# Patient Record
Sex: FEMALE | Race: WHITE | HISPANIC OR LATINO | URBAN - METROPOLITAN AREA
[De-identification: names, ages, dates, MRNs, and addresses within clinical notes are randomized per-mention and may not be internally consistent; named-entity substitution may affect disease eponyms.]

---

## 2021-04-21 ENCOUNTER — EMERGENCY (EMERGENCY)
Facility: HOSPITAL | Age: 76
LOS: 1 days | Discharge: HOME | End: 2021-04-21
Attending: EMERGENCY MEDICINE
Payer: MEDICARE

## 2021-04-21 VITALS
OXYGEN SATURATION: 98 % | HEART RATE: 70 BPM | DIASTOLIC BLOOD PRESSURE: 88 MMHG | SYSTOLIC BLOOD PRESSURE: 157 MMHG | TEMPERATURE: 98 F | RESPIRATION RATE: 18 BRPM

## 2021-04-21 VITALS
RESPIRATION RATE: 17 BRPM | TEMPERATURE: 99 F | DIASTOLIC BLOOD PRESSURE: 83 MMHG | OXYGEN SATURATION: 98 % | HEART RATE: 86 BPM | SYSTOLIC BLOOD PRESSURE: 146 MMHG

## 2021-04-21 LAB
ALBUMIN SERPL ELPH-MCNC: 3.4 G/DL — LOW (ref 3.5–5)
ALP SERPL-CCNC: 77 U/L — SIGNIFICANT CHANGE UP (ref 40–120)
ALT FLD-CCNC: 28 U/L DA — SIGNIFICANT CHANGE UP (ref 10–60)
ANION GAP SERPL CALC-SCNC: 4 MMOL/L — LOW (ref 5–17)
APTT BLD: 33.6 SEC — SIGNIFICANT CHANGE UP (ref 27.5–35.5)
AST SERPL-CCNC: 24 U/L — SIGNIFICANT CHANGE UP (ref 10–40)
BASOPHILS # BLD AUTO: 0.04 K/UL — SIGNIFICANT CHANGE UP (ref 0–0.2)
BASOPHILS NFR BLD AUTO: 0.6 % — SIGNIFICANT CHANGE UP (ref 0–2)
BILIRUB SERPL-MCNC: 0.4 MG/DL — SIGNIFICANT CHANGE UP (ref 0.2–1.2)
BUN SERPL-MCNC: 17 MG/DL — SIGNIFICANT CHANGE UP (ref 7–18)
CALCIUM SERPL-MCNC: 8.9 MG/DL — SIGNIFICANT CHANGE UP (ref 8.4–10.5)
CHLORIDE SERPL-SCNC: 107 MMOL/L — SIGNIFICANT CHANGE UP (ref 96–108)
CO2 SERPL-SCNC: 26 MMOL/L — SIGNIFICANT CHANGE UP (ref 22–31)
CREAT SERPL-MCNC: 0.7 MG/DL — SIGNIFICANT CHANGE UP (ref 0.5–1.3)
EOSINOPHIL # BLD AUTO: 0.03 K/UL — SIGNIFICANT CHANGE UP (ref 0–0.5)
EOSINOPHIL NFR BLD AUTO: 0.4 % — SIGNIFICANT CHANGE UP (ref 0–6)
GLUCOSE SERPL-MCNC: 89 MG/DL — SIGNIFICANT CHANGE UP (ref 70–99)
HCT VFR BLD CALC: 40.7 % — SIGNIFICANT CHANGE UP (ref 34.5–45)
HGB BLD-MCNC: 13.5 G/DL — SIGNIFICANT CHANGE UP (ref 11.5–15.5)
IMM GRANULOCYTES NFR BLD AUTO: 0.4 % — SIGNIFICANT CHANGE UP (ref 0–1.5)
INR BLD: 0.98 RATIO — SIGNIFICANT CHANGE UP (ref 0.88–1.16)
LYMPHOCYTES # BLD AUTO: 1.59 K/UL — SIGNIFICANT CHANGE UP (ref 1–3.3)
LYMPHOCYTES # BLD AUTO: 21.9 % — SIGNIFICANT CHANGE UP (ref 13–44)
MCHC RBC-ENTMCNC: 29.9 PG — SIGNIFICANT CHANGE UP (ref 27–34)
MCHC RBC-ENTMCNC: 33.2 GM/DL — SIGNIFICANT CHANGE UP (ref 32–36)
MCV RBC AUTO: 90.2 FL — SIGNIFICANT CHANGE UP (ref 80–100)
MONOCYTES # BLD AUTO: 0.58 K/UL — SIGNIFICANT CHANGE UP (ref 0–0.9)
MONOCYTES NFR BLD AUTO: 8 % — SIGNIFICANT CHANGE UP (ref 2–14)
NEUTROPHILS # BLD AUTO: 4.98 K/UL — SIGNIFICANT CHANGE UP (ref 1.8–7.4)
NEUTROPHILS NFR BLD AUTO: 68.7 % — SIGNIFICANT CHANGE UP (ref 43–77)
NRBC # BLD: 0 /100 WBCS — SIGNIFICANT CHANGE UP (ref 0–0)
PLATELET # BLD AUTO: 197 K/UL — SIGNIFICANT CHANGE UP (ref 150–400)
POTASSIUM SERPL-MCNC: 4.2 MMOL/L — SIGNIFICANT CHANGE UP (ref 3.5–5.3)
POTASSIUM SERPL-SCNC: 4.2 MMOL/L — SIGNIFICANT CHANGE UP (ref 3.5–5.3)
PROT SERPL-MCNC: 7 G/DL — SIGNIFICANT CHANGE UP (ref 6–8.3)
PROTHROM AB SERPL-ACNC: 11.7 SEC — SIGNIFICANT CHANGE UP (ref 10.6–13.6)
RBC # BLD: 4.51 M/UL — SIGNIFICANT CHANGE UP (ref 3.8–5.2)
RBC # FLD: 12.3 % — SIGNIFICANT CHANGE UP (ref 10.3–14.5)
SODIUM SERPL-SCNC: 137 MMOL/L — SIGNIFICANT CHANGE UP (ref 135–145)
WBC # BLD: 7.25 K/UL — SIGNIFICANT CHANGE UP (ref 3.8–10.5)
WBC # FLD AUTO: 7.25 K/UL — SIGNIFICANT CHANGE UP (ref 3.8–10.5)

## 2021-04-21 PROCEDURE — 85025 COMPLETE CBC W/AUTO DIFF WBC: CPT

## 2021-04-21 PROCEDURE — 85730 THROMBOPLASTIN TIME PARTIAL: CPT

## 2021-04-21 PROCEDURE — 93971 EXTREMITY STUDY: CPT | Mod: 26,RT

## 2021-04-21 PROCEDURE — 36415 COLL VENOUS BLD VENIPUNCTURE: CPT

## 2021-04-21 PROCEDURE — 85610 PROTHROMBIN TIME: CPT

## 2021-04-21 PROCEDURE — 93971 EXTREMITY STUDY: CPT

## 2021-04-21 PROCEDURE — 80053 COMPREHEN METABOLIC PANEL: CPT

## 2021-04-21 PROCEDURE — 99284 EMERGENCY DEPT VISIT MOD MDM: CPT | Mod: 25

## 2021-04-21 PROCEDURE — 99285 EMERGENCY DEPT VISIT HI MDM: CPT

## 2021-04-21 NOTE — ED PROVIDER NOTE - PROGRESS NOTE DETAILS
Pt informed of popliteal cyst, advised supportive care, PMD f/u, and consideration for outpatient MRI.

## 2021-04-21 NOTE — ED PROVIDER NOTE - NSFOLLOWUPINSTRUCTIONS_ED_ALL_ED_FT
Quiste divina Elder    LO QUE NECESITA SABER:    Un quiste de Jael es brando protuberancia abultada de líquido detrás de la rodilla. Un quiste de Jael puede desarrollarse si tiene brando lesión en la rodilla, o brando afección darline la osteoartritis o un trastorno del tejido conectivo. Un quiste de Jael también puede ser llamado quiste poplíteo.    INSTRUCCIONES SOBRE EL [facility]ITAL[facility]:    Regrese a la lai de emergencias si:  •Usted tiene dolor intenso.      •Usted tiene moretones en el tobillo debajo del quiste.      •Higginbotham pantorrilla se ve azulada debajo del quiste.      •Higginbotham pantorrilla o rodilla está inflamada o sangrando.      Llame a higginbotham médico si:  •Tiene fiebre.      •El dolor no mejora con medicamentos.      •Usted tiene preguntas o inquietudes acerca de higginbotham condición o cuidado.      Medicamentos:  •Los PRERNA,darline el ibuprofeno, ayudan a disminuir la inflamación, el dolor y la fiebre. Los PRERNA pueden causar sangrado estomacal o problemas renales en ciertas personas. Si usted rabia un medicamento anticoagulante, siempre pregúntele a higginbotham médico si los PRERNA son seguros para usted. Siempre selena la etiqueta de alina medicamento y siga las instrucciones.      •[place] cristian medicamentos darline se le haya indicado.Consulte con higginbotham médico si usted hill que higginbotham medicamento no le está ayudando o si presenta efectos secundarios. Infórmele si es alérgico a algún medicamento. Mantenga brando lista actualizada de los medicamentos, las vitaminas y los productos herbales que rabia. Incluya los siguientes datos de los medicamentos: cantidad, frecuencia y motivo de administración. Traiga con usted la lista o los envases de las píldoras a cristian citas de seguimiento. Lleve la lista de los medicamentos con usted en emily de brando emergencia.      Cuidado de higginbotham rodilla:  •Descanse tanto darline sea necesario.Limite movimiento mientras higginbotham rodilla se recupera. Rayne ayudará a disminuir el riesgo de hacer más daño a higginbotham rodilla. Puede que usted necesite utilizar muletas para quitar peso de la rodilla lesionada. Use las muletas darline se le indica.      •Póngale hielo a higginbotham rodilla.El hielo ayuda a disminuir la inflamación y el dolor. Use brando compresa de hielo o ponga hielo en brando bolsa plástica. Envuelva el hielo con brando toalla antes de colocarlo sobre higginbotham piel. Aplique el hielo sobre la rodilla de 15 a 20 minutos, de 3 a 4 veces al día. Charis esto por 2 a 3 días.      •Apoye higginbotham rodilla.Envuélvala con brando venda elástica para darle apoyo. Pregúntele a higginbotham médico si usted necesita brando abrazadera para más apoyo. Rayne ayudará a disminuir la hinchazón y el movimiento para que higginbotham rodilla se sane.      •Eleve higginbotham rodilla.Use almohadas para levantar higginbotham rodilla sobre el nivel de higginbotham corazón tan frecuentemente darline pueda. Rayne ayudará a disminuir la hinchazón. No coloque la almohada dagoberto debajo de higginbotham rodilla. Colóquela debajo de la pantorrilla en higginbotham lugar.  Elevar la pierna           •Vaya a terapia física según indicaciones.Un fisioterapeuta le puede enseñar ejercicios para ayudarle a mejorar el movimiento y la fuerza, y para disminuir el dolor.      Acuda a la consulta de control con higginbotham médico según las indicaciones:Anote cristian preguntas para que se acuerde de hacerlas leticia cristian visitas.       © Copyright [employer] 2021           back to top                          © Copyright [employer] 2021

## 2021-04-21 NOTE — ED PROVIDER NOTE - CLINICAL SUMMARY MEDICAL DECISION MAKING FREE TEXT BOX
77 y/o F patient with diffuse lower extremity swelling. Will do doppler of the right lower extremity, labs, and reassess. 77 y/o F patient with diffuse right lower leg swelling/pain. Will do doppler of the right lower extremity, labs, and reassess.

## 2021-04-21 NOTE — ED PROVIDER NOTE - OBJECTIVE STATEMENT
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 Andrei Contreras with c/o diffuse lower extremity swelling and pain. Patient denies any injury, chest pain, shortness of breath, history of DVT, PE or any other complaints. No smoking.

## 2021-04-21 NOTE — ED PROVIDER NOTE - PATIENT PORTAL LINK FT
You can access the FollowMyHealth Patient Portal offered by [facility] by registering at the following website: [email or web address]. By joining [employer]’s FollowMyHealth portal, you will also be able to view your health information using other applications (apps) compatible with our system.

## 2021-10-15 ENCOUNTER — EMERGENCY (EMERGENCY)
Facility: HOSPITAL | Age: 76
LOS: 1 days | Discharge: HOME | End: 2021-10-15
Attending: EMERGENCY MEDICINE
Payer: MEDICARE

## 2021-10-15 VITALS
SYSTOLIC BLOOD PRESSURE: 126 MMHG | DIASTOLIC BLOOD PRESSURE: 85 MMHG | HEART RATE: 71 BPM | TEMPERATURE: 98 F | OXYGEN SATURATION: 95 % | RESPIRATION RATE: 18 BRPM

## 2021-10-15 VITALS
SYSTOLIC BLOOD PRESSURE: 117 MMHG | OXYGEN SATURATION: 99 % | HEART RATE: 76 BPM | DIASTOLIC BLOOD PRESSURE: 69 MMHG | RESPIRATION RATE: 18 BRPM | TEMPERATURE: 98 F

## 2021-10-15 LAB
ALBUMIN SERPL ELPH-MCNC: 2.7 G/DL — LOW (ref 3.5–5)
ALP SERPL-CCNC: 69 U/L — SIGNIFICANT CHANGE UP (ref 40–120)
ALT FLD-CCNC: 17 U/L DA — SIGNIFICANT CHANGE UP (ref 10–60)
ANION GAP SERPL CALC-SCNC: 5 MMOL/L — SIGNIFICANT CHANGE UP (ref 5–17)
APPEARANCE UR: CLEAR — SIGNIFICANT CHANGE UP
AST SERPL-CCNC: 23 U/L — SIGNIFICANT CHANGE UP (ref 10–40)
BASOPHILS # BLD AUTO: 0.02 K/UL — SIGNIFICANT CHANGE UP (ref 0–0.2)
BASOPHILS NFR BLD AUTO: 0.2 % — SIGNIFICANT CHANGE UP (ref 0–2)
BILIRUB SERPL-MCNC: 0.6 MG/DL — SIGNIFICANT CHANGE UP (ref 0.2–1.2)
BILIRUB UR-MCNC: NEGATIVE — SIGNIFICANT CHANGE UP
BUN SERPL-MCNC: 18 MG/DL — SIGNIFICANT CHANGE UP (ref 7–18)
CALCIUM SERPL-MCNC: 9.3 MG/DL — SIGNIFICANT CHANGE UP (ref 8.4–10.5)
CHLORIDE SERPL-SCNC: 104 MMOL/L — SIGNIFICANT CHANGE UP (ref 96–108)
CO2 SERPL-SCNC: 28 MMOL/L — SIGNIFICANT CHANGE UP (ref 22–31)
COLOR SPEC: YELLOW — SIGNIFICANT CHANGE UP
CREAT SERPL-MCNC: 0.6 MG/DL — SIGNIFICANT CHANGE UP (ref 0.5–1.3)
DIFF PNL FLD: ABNORMAL
EOSINOPHIL # BLD AUTO: 0.06 K/UL — SIGNIFICANT CHANGE UP (ref 0–0.5)
EOSINOPHIL NFR BLD AUTO: 0.6 % — SIGNIFICANT CHANGE UP (ref 0–6)
GLUCOSE SERPL-MCNC: 127 MG/DL — HIGH (ref 70–99)
GLUCOSE UR QL: NEGATIVE — SIGNIFICANT CHANGE UP
HCT VFR BLD CALC: 46.5 % — HIGH (ref 34.5–45)
HGB BLD-MCNC: 14.6 G/DL — SIGNIFICANT CHANGE UP (ref 11.5–15.5)
IMM GRANULOCYTES NFR BLD AUTO: 0.4 % — SIGNIFICANT CHANGE UP (ref 0–1.5)
KETONES UR-MCNC: ABNORMAL
LEUKOCYTE ESTERASE UR-ACNC: NEGATIVE — SIGNIFICANT CHANGE UP
LIDOCAIN IGE QN: 62 U/L — LOW (ref 73–393)
LYMPHOCYTES # BLD AUTO: 0.83 K/UL — LOW (ref 1–3.3)
LYMPHOCYTES # BLD AUTO: 8.3 % — LOW (ref 13–44)
MCHC RBC-ENTMCNC: 29.6 PG — SIGNIFICANT CHANGE UP (ref 27–34)
MCHC RBC-ENTMCNC: 31.4 GM/DL — LOW (ref 32–36)
MCV RBC AUTO: 94.3 FL — SIGNIFICANT CHANGE UP (ref 80–100)
MONOCYTES # BLD AUTO: 0.88 K/UL — SIGNIFICANT CHANGE UP (ref 0–0.9)
MONOCYTES NFR BLD AUTO: 8.8 % — SIGNIFICANT CHANGE UP (ref 2–14)
NEUTROPHILS # BLD AUTO: 8.19 K/UL — HIGH (ref 1.8–7.4)
NEUTROPHILS NFR BLD AUTO: 81.7 % — HIGH (ref 43–77)
NITRITE UR-MCNC: NEGATIVE — SIGNIFICANT CHANGE UP
NRBC # BLD: 0 /100 WBCS — SIGNIFICANT CHANGE UP (ref 0–0)
PH UR: 6 — SIGNIFICANT CHANGE UP (ref 5–8)
PLATELET # BLD AUTO: 174 K/UL — SIGNIFICANT CHANGE UP (ref 150–400)
POTASSIUM SERPL-MCNC: 4.5 MMOL/L — SIGNIFICANT CHANGE UP (ref 3.5–5.3)
POTASSIUM SERPL-SCNC: 4.5 MMOL/L — SIGNIFICANT CHANGE UP (ref 3.5–5.3)
PROT SERPL-MCNC: 7.2 G/DL — SIGNIFICANT CHANGE UP (ref 6–8.3)
PROT UR-MCNC: 15
RBC # BLD: 4.93 M/UL — SIGNIFICANT CHANGE UP (ref 3.8–5.2)
RBC # FLD: 11.9 % — SIGNIFICANT CHANGE UP (ref 10.3–14.5)
SODIUM SERPL-SCNC: 137 MMOL/L — SIGNIFICANT CHANGE UP (ref 135–145)
SP GR SPEC: 1.02 — SIGNIFICANT CHANGE UP (ref 1.01–1.02)
UROBILINOGEN FLD QL: NEGATIVE — SIGNIFICANT CHANGE UP
WBC # BLD: 10.02 K/UL — SIGNIFICANT CHANGE UP (ref 3.8–10.5)
WBC # FLD AUTO: 10.02 K/UL — SIGNIFICANT CHANGE UP (ref 3.8–10.5)

## 2021-10-15 PROCEDURE — 99284 EMERGENCY DEPT VISIT MOD MDM: CPT

## 2021-10-15 PROCEDURE — 76830 TRANSVAGINAL US NON-OB: CPT | Mod: 26

## 2021-10-15 PROCEDURE — 76856 US EXAM PELVIC COMPLETE: CPT | Mod: 26

## 2021-10-15 PROCEDURE — 81001 URINALYSIS AUTO W/SCOPE: CPT

## 2021-10-15 PROCEDURE — 96374 THER/PROPH/DIAG INJ IV PUSH: CPT

## 2021-10-15 PROCEDURE — 93975 VASCULAR STUDY: CPT

## 2021-10-15 PROCEDURE — 99284 EMERGENCY DEPT VISIT MOD MDM: CPT | Mod: 25

## 2021-10-15 PROCEDURE — 96361 HYDRATE IV INFUSION ADD-ON: CPT

## 2021-10-15 PROCEDURE — 76857 US EXAM PELVIC LIMITED: CPT

## 2021-10-15 PROCEDURE — 85025 COMPLETE CBC W/AUTO DIFF WBC: CPT

## 2021-10-15 PROCEDURE — 36415 COLL VENOUS BLD VENIPUNCTURE: CPT

## 2021-10-15 PROCEDURE — 96375 TX/PRO/DX INJ NEW DRUG ADDON: CPT

## 2021-10-15 PROCEDURE — 83690 ASSAY OF LIPASE: CPT

## 2021-10-15 PROCEDURE — 76830 TRANSVAGINAL US NON-OB: CPT

## 2021-10-15 PROCEDURE — 93976 VASCULAR STUDY: CPT | Mod: 26,59

## 2021-10-15 PROCEDURE — 80053 COMPREHEN METABOLIC PANEL: CPT

## 2021-10-15 RX ORDER — KETOROLAC TROMETHAMINE 30 MG/ML
30 SYRINGE (ML) INJECTION ONCE
Refills: 0 | Status: DISCONTINUED | OUTPATIENT
Start: 2021-10-15 | End: 2021-10-15

## 2021-10-15 RX ORDER — SODIUM CHLORIDE 9 MG/ML
1000 INJECTION INTRAMUSCULAR; INTRAVENOUS; SUBCUTANEOUS ONCE
Refills: 0 | Status: COMPLETED | OUTPATIENT
Start: 2021-10-15 | End: 2021-10-15

## 2021-10-15 RX ORDER — IBUPROFEN 200 MG
1 TABLET ORAL
Qty: 20 | Refills: 0
Start: 2021-10-15

## 2021-10-15 RX ORDER — ONDANSETRON 8 MG/1
4 TABLET, FILM COATED ORAL ONCE
Refills: 0 | Status: COMPLETED | OUTPATIENT
Start: 2021-10-15 | End: 2021-10-15

## 2021-10-15 RX ADMIN — SODIUM CHLORIDE 1000 MILLILITER(S): 9 INJECTION INTRAMUSCULAR; INTRAVENOUS; SUBCUTANEOUS at 08:19

## 2021-10-15 RX ADMIN — SODIUM CHLORIDE 1000 MILLILITER(S): 9 INJECTION INTRAMUSCULAR; INTRAVENOUS; SUBCUTANEOUS at 10:06

## 2021-10-15 RX ADMIN — Medication 30 MILLIGRAM(S): at 09:05

## 2021-10-15 RX ADMIN — ONDANSETRON 4 MILLIGRAM(S): 8 TABLET, FILM COATED ORAL at 08:19

## 2021-10-15 RX ADMIN — Medication 30 MILLIGRAM(S): at 08:19

## 2021-10-15 NOTE — ED ADULT NURSE NOTE - OBJECTIVE STATEMENT
presents with c/o lower abdominal pain, nausea and vomiting onset last night denies dysuria fever/chills.

## 2021-10-15 NOTE — ED ADULT NURSE NOTE - NSIMPLEMENTINTERV_GEN_ALL_ED
Implemented All Fall Risk Interventions:  [place] to call system. Call bell, personal items and telephone within reach. Instruct patient to call for assistance. Room bathroom lighting operational. Non-slip footwear when patient is off stretcher. Physically safe environment: no spills, clutter or unnecessary equipment. Stretcher in lowest position, wheels locked, appropriate side rails in place. Provide visual cue, wrist band, yellow gown, etc. Monitor gait and stability. Monitor for mental status changes and reorient to person, place, and time. Review medications for side effects contributing to fall risk. Reinforce activity limits and safety measures with patient and family.

## 2021-10-15 NOTE — ED PROVIDER NOTE - OBJECTIVE STATEMENT
75 y/o female presents with lower abdominal pain.  Patient is [place] speaking.  Translation services offered however patient prefers to use  whom is at bedside.  PMHx of pseudogout.  Discomfort began hours prior to arrival and associated with nausea and vomiting.  No bloody and non-bilious.  pt denies constipation or diarrhea.  + urinary frequency.  Denies chest pain, fever, or vaginal discharge.

## 2021-10-15 NOTE — ED PROVIDER NOTE - PATIENT PORTAL LINK FT
You can access the FollowMyHealth Patient Portal offered by [facility] by registering at the following website: [email or web address]. By joining [employer]’s FollowMyHealth portal, you will also be able to view your health information using other applications (apps) compatible with our system.

## 2021-10-15 NOTE — ED PROVIDER NOTE - NSFOLLOWUPINSTRUCTIONS_ED_ALL_ED_FT
Ovarian Cyst       An ovarian cyst is a fluid-filled sac that forms on an ovary. The ovaries are small organs that produce eggs in women. Various types of cysts can form on the ovaries. Some may cause symptoms and require treatment. Most ovarian cysts go away on their own, are not cancerous (are benign), and do not cause problems.      What are the causes?  Ovarian cysts may be caused by:  •Ovarian hyperstimulation syndrome. This is a condition that can develop from taking fertility medicines. It causes multiple large ovarian cysts to form.      •Polycystic ovarian syndrome (PCOS). This is a common hormonal disorder that can cause ovarian cysts to form, and can cause problems with your period or fertility.      •The normal menstrual cycle.        What increases the risk?  The following factors may make you more likely to develop this condition:  •Being overweight or obese.      •Taking fertility medicines.      •Taking certain forms of hormonal birth control.      •Smoking.        What are the signs or symptoms?  Many ovarian cysts do not cause symptoms. If symptoms are present, they may include:  •Pelvic pain or pressure.      •Pain in the lower abdomen.      •Pain during sex.      •Abdominal swelling.      •Abnormal menstrual periods.      •Increasing pain with menstrual periods.        How is this diagnosed?  These cysts are commonly found during a routine pelvic exam. You may have tests to find out more about the cyst, such as:  •Ultrasound.      •CT scan.      •MRI.      •Blood tests.        How is this treated?    Many ovarian cysts go away on their own without treatment. Your health care provider may want to check your cyst regularly for 2–3 months to see if it changes. If you are in menopause, it is especially important to have your cyst monitored closely because menopausal women have a higher rate of ovarian cancer.  When treatment is needed, it may include:  •Medicines to help relieve pain.      •A procedure to drain the cyst (aspiration).      •Surgery to remove the whole cyst (cystectomy).      •Hormone treatment or birth control pills. These methods are sometimes used to help keep cysts from coming back.      •Surgery to remove the ovary (oophorectomy).        Follow these instructions at home:    •Take over-the-counter and prescription medicines only as told by your health care provider.      •Ask your health care provider if any medicine prescribed to you requires you to avoid driving or using machinery.      •Get regular pelvic exams and Pap tests as often as told by your health care provider.      •Return to your normal activities as told by your health care provider. Ask your health care provider what activities are safe for you.      • Do not use any products that contain nicotine or tobacco, such as cigarettes, e-cigarettes, and chewing tobacco. If you need help quitting, ask your health care provider.      •Keep all follow-up visits. This is important.        Contact a health care provider if:    •Your periods are late, irregular, painful, or they stop.      •You have pelvic pain that does not go away.      •You have pressure on your bladder or trouble emptying your bladder completely.    •You have any of the following:  •A feeling of fullness.      •You are gaining weight or losing weight without changing your exercise and eating habits.      •Pain, swelling, or bloating in the abdomen.      •Loss of appetite.      •Pain and pressure in your back and pelvis.        •You think you may be pregnant.        Get help right away if:    •You have abdominal or pelvic pain that is severe or gets worse.      •You cannot eat or drink without vomiting.      •You suddenly develop a fever or chills.      •Your menstrual period is much heavier than usual.        Summary    •An ovarian cyst is a fluid-filled sac that forms on an ovary.      •Some ovarian cysts may cause symptoms and require treatment.      •These cysts are commonly found during a routine pelvic exam.      •Many ovarian cysts go away on their own without treatment.      This information is not intended to replace advice given to you by your health care provider. Make sure you discuss any questions you have with your health care provider.      Document Revised: 05/27/2021 Document Reviewed: 05/27/2021    [employer] Patient Education © 2021 [employer]

## 2021-10-15 NOTE — ED PROVIDER NOTE - CLINICAL SUMMARY MEDICAL DECISION MAKING FREE TEXT BOX
pt presents with suprapubic pain, nausea and vomiting, + tender on exam.  UTI vs possible ovarian cyst vs less likely torsion. Will check labs, UA, sono, and reassess.

## 2022-03-21 ENCOUNTER — EMERGENCY (EMERGENCY)
Facility: HOSPITAL | Age: 77
LOS: 1 days | Discharge: HOME | End: 2022-03-21
Attending: STUDENT IN AN ORGANIZED HEALTH CARE EDUCATION/TRAINING PROGRAM
Payer: MEDICARE

## 2022-03-21 VITALS
TEMPERATURE: 100 F | SYSTOLIC BLOOD PRESSURE: 141 MMHG | HEIGHT: 66 IN | RESPIRATION RATE: 18 BRPM | DIASTOLIC BLOOD PRESSURE: 85 MMHG | HEART RATE: 77 BPM | WEIGHT: 169.98 LBS | OXYGEN SATURATION: 100 %

## 2022-03-21 LAB
ALBUMIN SERPL ELPH-MCNC: 2.8 G/DL — LOW (ref 3.5–5)
ALP SERPL-CCNC: 76 U/L — SIGNIFICANT CHANGE UP (ref 40–120)
ALT FLD-CCNC: 19 U/L DA — SIGNIFICANT CHANGE UP (ref 10–60)
ANION GAP SERPL CALC-SCNC: 6 MMOL/L — SIGNIFICANT CHANGE UP (ref 5–17)
AST SERPL-CCNC: 12 U/L — SIGNIFICANT CHANGE UP (ref 10–40)
BASOPHILS # BLD AUTO: 0.03 K/UL — SIGNIFICANT CHANGE UP (ref 0–0.2)
BASOPHILS NFR BLD AUTO: 0.3 % — SIGNIFICANT CHANGE UP (ref 0–2)
BILIRUB SERPL-MCNC: 0.4 MG/DL — SIGNIFICANT CHANGE UP (ref 0.2–1.2)
BUN SERPL-MCNC: 13 MG/DL — SIGNIFICANT CHANGE UP (ref 7–18)
CALCIUM SERPL-MCNC: 9.7 MG/DL — SIGNIFICANT CHANGE UP (ref 8.4–10.5)
CHLORIDE SERPL-SCNC: 103 MMOL/L — SIGNIFICANT CHANGE UP (ref 96–108)
CO2 SERPL-SCNC: 28 MMOL/L — SIGNIFICANT CHANGE UP (ref 22–31)
CREAT SERPL-MCNC: 0.74 MG/DL — SIGNIFICANT CHANGE UP (ref 0.5–1.3)
EGFR: 84 ML/MIN/1.73M2 — SIGNIFICANT CHANGE UP
EOSINOPHIL # BLD AUTO: 0.05 K/UL — SIGNIFICANT CHANGE UP (ref 0–0.5)
EOSINOPHIL NFR BLD AUTO: 0.6 % — SIGNIFICANT CHANGE UP (ref 0–6)
FLUAV AG NPH QL: SIGNIFICANT CHANGE UP
FLUBV AG NPH QL: SIGNIFICANT CHANGE UP
GLUCOSE SERPL-MCNC: 125 MG/DL — HIGH (ref 70–99)
HCT VFR BLD CALC: 40 % — SIGNIFICANT CHANGE UP (ref 34.5–45)
HGB BLD-MCNC: 13.5 G/DL — SIGNIFICANT CHANGE UP (ref 11.5–15.5)
IMM GRANULOCYTES NFR BLD AUTO: 0.3 % — SIGNIFICANT CHANGE UP (ref 0–1.5)
LYMPHOCYTES # BLD AUTO: 1.4 K/UL — SIGNIFICANT CHANGE UP (ref 1–3.3)
LYMPHOCYTES # BLD AUTO: 15.5 % — SIGNIFICANT CHANGE UP (ref 13–44)
MAGNESIUM SERPL-MCNC: 1.9 MG/DL — SIGNIFICANT CHANGE UP (ref 1.6–2.6)
MCHC RBC-ENTMCNC: 30 PG — SIGNIFICANT CHANGE UP (ref 27–34)
MCHC RBC-ENTMCNC: 33.8 GM/DL — SIGNIFICANT CHANGE UP (ref 32–36)
MCV RBC AUTO: 88.9 FL — SIGNIFICANT CHANGE UP (ref 80–100)
MONOCYTES # BLD AUTO: 0.75 K/UL — SIGNIFICANT CHANGE UP (ref 0–0.9)
MONOCYTES NFR BLD AUTO: 8.3 % — SIGNIFICANT CHANGE UP (ref 2–14)
NEUTROPHILS # BLD AUTO: 6.8 K/UL — SIGNIFICANT CHANGE UP (ref 1.8–7.4)
NEUTROPHILS NFR BLD AUTO: 75 % — SIGNIFICANT CHANGE UP (ref 43–77)
NRBC # BLD: 0 /100 WBCS — SIGNIFICANT CHANGE UP (ref 0–0)
PLATELET # BLD AUTO: 237 K/UL — SIGNIFICANT CHANGE UP (ref 150–400)
POTASSIUM SERPL-MCNC: 4.3 MMOL/L — SIGNIFICANT CHANGE UP (ref 3.5–5.3)
POTASSIUM SERPL-SCNC: 4.3 MMOL/L — SIGNIFICANT CHANGE UP (ref 3.5–5.3)
PROT SERPL-MCNC: 7.1 G/DL — SIGNIFICANT CHANGE UP (ref 6–8.3)
RBC # BLD: 4.5 M/UL — SIGNIFICANT CHANGE UP (ref 3.8–5.2)
RBC # FLD: 12.3 % — SIGNIFICANT CHANGE UP (ref 10.3–14.5)
SARS-COV-2 RNA SPEC QL NAA+PROBE: SIGNIFICANT CHANGE UP
SODIUM SERPL-SCNC: 137 MMOL/L — SIGNIFICANT CHANGE UP (ref 135–145)
WBC # BLD: 9.06 K/UL — SIGNIFICANT CHANGE UP (ref 3.8–10.5)
WBC # FLD AUTO: 9.06 K/UL — SIGNIFICANT CHANGE UP (ref 3.8–10.5)

## 2022-03-21 PROCEDURE — 99285 EMERGENCY DEPT VISIT HI MDM: CPT | Mod: 25

## 2022-03-21 PROCEDURE — 36415 COLL VENOUS BLD VENIPUNCTURE: CPT

## 2022-03-21 PROCEDURE — 83735 ASSAY OF MAGNESIUM: CPT

## 2022-03-21 PROCEDURE — 99284 EMERGENCY DEPT VISIT MOD MDM: CPT

## 2022-03-21 PROCEDURE — 85025 COMPLETE CBC W/AUTO DIFF WBC: CPT

## 2022-03-21 PROCEDURE — 71046 X-RAY EXAM CHEST 2 VIEWS: CPT | Mod: 26

## 2022-03-21 PROCEDURE — 71046 X-RAY EXAM CHEST 2 VIEWS: CPT

## 2022-03-21 PROCEDURE — 87637 SARSCOV2&INF A&B&RSV AMP PRB: CPT

## 2022-03-21 PROCEDURE — 93005 ELECTROCARDIOGRAM TRACING: CPT

## 2022-03-21 PROCEDURE — 80053 COMPREHEN METABOLIC PANEL: CPT

## 2022-03-21 RX ORDER — SODIUM CHLORIDE 9 MG/ML
1000 INJECTION INTRAMUSCULAR; INTRAVENOUS; SUBCUTANEOUS ONCE
Refills: 0 | Status: COMPLETED | OUTPATIENT
Start: 2022-03-21 | End: 2022-03-21

## 2022-03-21 RX ORDER — KETOROLAC TROMETHAMINE 30 MG/ML
30 SYRINGE (ML) INJECTION ONCE
Refills: 0 | Status: DISCONTINUED | OUTPATIENT
Start: 2022-03-21 | End: 2022-03-21

## 2022-03-21 RX ADMIN — Medication 30 MILLIGRAM(S): at 13:42

## 2022-03-21 RX ADMIN — SODIUM CHLORIDE 1000 MILLILITER(S): 9 INJECTION INTRAMUSCULAR; INTRAVENOUS; SUBCUTANEOUS at 13:43

## 2022-03-21 NOTE — ED PROVIDER NOTE - OBJECTIVE STATEMENT
77F, no significant pmh, presenting with 4 days of fever, body aches and cough. left eye is red and itchy. no recent travel or sock contacts. no vomiting, abdominal pain, pain or burning with urination.

## 2022-03-21 NOTE — ED PROVIDER NOTE - PATIENT PORTAL LINK FT
You can access the FollowMyHealth Patient Portal offered by [facility] by registering at the following website: [email or web address]. By joining [employer]’s FollowMyHealth portal, you will also be able to view your health information using other applications (apps) compatible with our system.

## 2022-03-21 NOTE — ED ADULT NURSE NOTE - NSIMPLEMENTINTERV_GEN_ALL_ED
Implemented All Fall with Harm Risk Interventions:  [place] to call system. Call bell, personal items and telephone within reach. Instruct patient to call for assistance. Room bathroom lighting operational. Non-slip footwear when patient is off stretcher. Physically safe environment: no spills, clutter or unnecessary equipment. Stretcher in lowest position, wheels locked, appropriate side rails in place. Provide visual cue, wrist band, yellow gown, etc. Monitor gait and stability. Monitor for mental status changes and reorient to person, place, and time. Review medications for side effects contributing to fall risk. Reinforce activity limits and safety measures with patient and family. Provide visual clues: red socks.

## 2022-03-21 NOTE — ED ADULT TRIAGE NOTE - CHIEF COMPLAINT QUOTE
walked in with c/o  fever / cough/ bodyaches x 3 taking  tylenol and motrin but  fever  was on and off

## 2022-03-21 NOTE — ED PROVIDER NOTE - NSFOLLOWUPINSTRUCTIONS_ED_ALL_ED_FT
You were seen in the emergency department for fever and body aches likely caused by a viral illness.     Please follow-up with your primary care doctor in the next 24-48 hours.     Please take Ibuprofen and Tylenol as prescribed on the bottles for pain control.     If you have any worsening symptoms, severe headache, chest pain, shortness of breath, please return to the emergency department.

## 2022-03-21 NOTE — ED ADULT NURSE NOTE - OBJECTIVE STATEMENT
presented to ed with c/o generalized body pain and on and off fever for 5 days with cough, denies any breathing difficulty

## 2022-03-25 ENCOUNTER — EMERGENCY (EMERGENCY)
Facility: HOSPITAL | Age: 77
LOS: 1 days | Discharge: HOME | End: 2022-03-25
Attending: EMERGENCY MEDICINE
Payer: MEDICARE

## 2022-03-25 VITALS
HEART RATE: 74 BPM | DIASTOLIC BLOOD PRESSURE: 78 MMHG | OXYGEN SATURATION: 99 % | RESPIRATION RATE: 18 BRPM | SYSTOLIC BLOOD PRESSURE: 128 MMHG | TEMPERATURE: 98 F

## 2022-03-25 VITALS
WEIGHT: 141.98 LBS | OXYGEN SATURATION: 99 % | SYSTOLIC BLOOD PRESSURE: 116 MMHG | HEIGHT: 66 IN | TEMPERATURE: 98 F | RESPIRATION RATE: 18 BRPM | DIASTOLIC BLOOD PRESSURE: 70 MMHG | HEART RATE: 95 BPM

## 2022-03-25 PROCEDURE — 99284 EMERGENCY DEPT VISIT MOD MDM: CPT | Mod: 25

## 2022-03-25 PROCEDURE — 96372 THER/PROPH/DIAG INJ SC/IM: CPT

## 2022-03-25 PROCEDURE — 90471 IMMUNIZATION ADMIN: CPT

## 2022-03-25 PROCEDURE — 70450 CT HEAD/BRAIN W/O DYE: CPT | Mod: MA

## 2022-03-25 PROCEDURE — 99285 EMERGENCY DEPT VISIT HI MDM: CPT

## 2022-03-25 PROCEDURE — 72125 CT NECK SPINE W/O DYE: CPT | Mod: 26,MA

## 2022-03-25 PROCEDURE — 90715 TDAP VACCINE 7 YRS/> IM: CPT

## 2022-03-25 PROCEDURE — 70450 CT HEAD/BRAIN W/O DYE: CPT | Mod: 26,MA

## 2022-03-25 PROCEDURE — 72125 CT NECK SPINE W/O DYE: CPT | Mod: MA

## 2022-03-25 RX ORDER — TETANUS TOXOID, REDUCED DIPHTHERIA TOXOID AND ACELLULAR PERTUSSIS VACCINE, ADSORBED 5; 2.5; 8; 8; 2.5 [IU]/.5ML; [IU]/.5ML; UG/.5ML; UG/.5ML; UG/.5ML
0.5 SUSPENSION INTRAMUSCULAR ONCE
Refills: 0 | Status: COMPLETED | OUTPATIENT
Start: 2022-03-25 | End: 2022-03-25

## 2022-03-25 RX ORDER — KETOROLAC TROMETHAMINE 30 MG/ML
30 SYRINGE (ML) INJECTION ONCE
Refills: 0 | Status: DISCONTINUED | OUTPATIENT
Start: 2022-03-25 | End: 2022-03-25

## 2022-03-25 RX ORDER — DICLOFENAC SODIUM 30 MG/G
1 GEL TOPICAL
Qty: 100 | Refills: 0
Start: 2022-03-25

## 2022-03-25 RX ADMIN — TETANUS TOXOID, REDUCED DIPHTHERIA TOXOID AND ACELLULAR PERTUSSIS VACCINE, ADSORBED 0.5 MILLILITER(S): 5; 2.5; 8; 8; 2.5 SUSPENSION INTRAMUSCULAR at 15:55

## 2022-03-25 RX ADMIN — Medication 30 MILLIGRAM(S): at 15:58

## 2022-03-25 RX ADMIN — Medication 30 MILLIGRAM(S): at 15:55

## 2022-03-25 NOTE — ED PROVIDER NOTE - NSFOLLOWUPINSTRUCTIONS_ED_ALL_ED_FT
Head Injury, Adult       There are many types of head injuries. Head injuries can be as minor as a small bump, or they can be a serious medical issue. More severe head injuries include:  •A jarring injury to the brain (concussion).      •A bruise (contusion) of the brain. This means there is bleeding in the brain that can cause swelling.      •A cracked skull (skull fracture).      •Bleeding in the brain that collects, clots, and forms a bump (hematoma).      After a head injury, most problems occur within the first 24 hours, but side effects may occur up to 7–10 days after the injury. It is important to watch your condition for any changes. You may need to be observed in the emergency department or urgent care, or you may be admitted to the hospital.      What are the causes?    There are many possible causes of a head injury. Serious head injuries may be caused by car accidents, bicycle or motorcycle accidents, sports injuries, falls, or being struck by an object.      What are the symptoms?    Symptoms of a head injury include a contusion, bump, or bleeding at the site of the injury. Other physical symptoms may include:  •Headache.      •Nausea or vomiting.      •Dizziness.      •Blurred or double vision.      •Being uncomfortable around bright lights or loud noises.      •Seizures.      •Feeling tired.      •Trouble being awakened.      •Loss of consciousness.      Mental or emotional symptoms may include:  •Irritability.      •Confusion and memory problems.      •Poor attention and concentration.      •Changes in eating or sleeping habits.      •Anxiety or depression.        How is this diagnosed?    This condition can usually be diagnosed based on your symptoms, a description of the injury, and a physical exam. You may also have imaging tests done, such as a CT scan or an MRI.      How is this treated?    Treatment for this condition depends on the severity and type of injury you have. The main goal of treatment is to prevent complications and allow the brain time to heal.    Mild head injury     If you have a mild head injury, you may be sent home, and treatment may include:  •Observation. A responsible adult should stay with you for 24 hours after your injury and check on you often.      •Physical rest.      •Brain rest.      •Pain medicines.      Severe head injury    If you have a severe head injury, treatment may include:•Close observation. This includes hospitalization with the following care:  •Frequent physical exams.      •Frequent checks of how your brain and nervous system are working (neurological status).      •Checking your blood pressure and oxygen levels.        •Medicines to relieve pain, prevent seizures, and decrease brain swelling.      •Airway protection and breathing support. This may include using a ventilator.      •Treatments that monitor and manage swelling inside the brain.    •Brain surgery. This may be needed to:  •Remove a collection of blood or blood clots.      •Stop the bleeding.      •Remove a part of the skull to allow room for the brain to swell.          Follow these instructions at home:    Activity     •Rest and avoid activities that are physically hard or tiring.      •Make sure you get enough sleep.    •Let your brain rest by limiting activities that require a lot of thought or attention, such as:  •Watching TV.      •Playing memory games and puzzles.      •Job-related work or homework.      •Working on the computer, using social media, and texting.        •Avoid activities that could cause another head injury, such as playing sports, until your health care provider approves. Having another head injury, especially before the first one has healed, can be dangerous.      •Ask your health care provider when it is safe for you to return to your regular activities, including work or school. Ask your health care provider for a step-by-step plan for gradually returning to activities.      •Ask your health care provider when you can drive, ride a bicycle, or use heavy machinery. Your ability to react may be slower after a brain injury. Do not do these activities if you are dizzy.        Lifestyle      • Do not drink alcohol until your health care provider approves. Do not use drugs. Alcohol and certain drugs may slow your recovery and can put you at risk of further injury.      •If it is harder than usual to remember things, write them down.      •If you are easily distracted, try to do one thing at a time.      •Talk with family members or close friends when making important decisions.      •Tell your friends, family, a trusted colleague, and  about your injury, symptoms, and restrictions. Have them watch for any new or worsening problems.      General instructions     •Take over-the-counter and prescription medicines only as told by your health care provider.      •Have someone stay with you for 24 hours after your head injury. This person should watch you for any changes in your symptoms and be ready to seek medical help.      •Keep all follow-up visits as told by your health care provider. This is important.        How is this prevented?    •Work on improving your balance and strength to avoid falls.      •Wear a seat belt when you are in a moving vehicle.      •Wear a helmet when riding a bicycle, skiing, or doing any other sport or activity that has a risk of injury.    •If you drink alcohol:•Limit how much you use to:  •0–1 drink a day for nonpregnant women.      •0–2 drinks a day for men.        •Be aware of how much alcohol is in your drink. In the [place] one drink equals one 12 oz bottle of beer (355 mL), one 5 oz glass of wine (148 mL), or one 1½ oz glass of hard liquor (44 mL).      •Take safety measures in your home, such as:  •Removing clutter and tripping hazards from floors and stairways.      •Using grab bars in bathrooms and handrails by stairs.      •Placing non-slip mats on floors and in bathtubs.      •Improving lighting in dim areas.          Where to find more information    •Centers for Disease Control and Prevention: www.cdc.gov        Get help right away if:  •You have:  •A severe headache that is not helped by medicine.      •Trouble walking or weakness in your arms and legs.      •Clear or bloody fluid coming from your nose or ears.      •Changes in your vision.      •A seizure.      •Increased confusion or irritability.        •Your symptoms get worse.      •You are sleepier than normal and have trouble staying awake.      •You lose your balance.      •Your pupils change size.      •Your speech is slurred.      •Your dizziness gets worse.      •You vomit.      These symptoms may represent a serious problem that is an emergency. Do not wait to see if the symptoms will go away. Get medical help right away. Call your local emergency services (911 in the [place] Do not drive yourself to the hospital.       Summary    •Head injuries can be minor, or they can be a serious medical issue requiring immediate attention.      •Treatment for this condition depends on the severity and type of injury you have.      •Have someone stay with you for 24 hours after your injury and check on you often.      •Ask your health care provider when it is safe for you to return to your regular activities, including work or school.      •Head injury prevention includes wearing a seat belt in a motor vehicle, using a helmet on a bicycle, limiting alcohol use, and taking safety measures in your home.      This information is not intended to replace advice given to you by your health care provider. Make sure you discuss any questions you have with your health care provider.      Document Revised: 10/30/2020 Document Reviewed: 10/30/2020    [employer] Patient Education © 2022 [employer].

## 2022-03-25 NOTE — ED PROVIDER NOTE - CLINICAL SUMMARY MEDICAL DECISION MAKING FREE TEXT BOX
78 y/o woman, no significant PMH, c/o feeling dizzy last night and then fell and struck head.  No LOC--CT head and C-S show no acute pathology; advised strict return precautions and PMD f/u.

## 2022-03-25 NOTE — ED ADULT NURSE NOTE - DISCHARGE DATE/TIME
S/p new pacemaker, implanted on 7-5-2018. Appropriate device function demonstrated. Wound site appears clear. Advised to watch for redness, swelling, oozing or fever. Pt verbalized understanding. See back in 5 weeks for final testing.  
25-Mar-2022 15:58

## 2022-03-25 NOTE — ED PROVIDER NOTE - OBJECTIVE STATEMENT
76 y/o woman, no significant PMH, c/o feeling dizzy last night and then fell and struck head.  No LOC.  Small laceration to back of head.  No weakness/numbness/vomiting/neck pain/back pain.

## 2022-03-25 NOTE — ED PROVIDER NOTE - PATIENT PORTAL LINK FT
You can access the FollowMyHealth Patient Portal offered by [facility] by registering at the following website: [email or web address]. By joining [employer]’s FollowMyHealth portal, you will also be able to view your health information using other applications (apps) compatible with our system.

## 2022-05-16 ENCOUNTER — EMERGENCY (EMERGENCY)
Facility: HOSPITAL | Age: 77
LOS: 1 days | Discharge: HOME | End: 2022-05-16
Attending: EMERGENCY MEDICINE
Payer: MEDICARE

## 2022-05-16 ENCOUNTER — INPATIENT (INPATIENT)
Facility: HOSPITAL | Age: 77
LOS: 1 days | Discharge: HOME | DRG: 177 | End: 2022-05-18
Attending: INTERNAL MEDICINE | Admitting: INTERNAL MEDICINE
Payer: MEDICARE

## 2022-05-16 VITALS
HEIGHT: 66 IN | OXYGEN SATURATION: 98 % | WEIGHT: 141.98 LBS | SYSTOLIC BLOOD PRESSURE: 137 MMHG | RESPIRATION RATE: 16 BRPM | TEMPERATURE: 98 F | DIASTOLIC BLOOD PRESSURE: 80 MMHG | HEART RATE: 98 BPM

## 2022-05-16 VITALS
WEIGHT: 138.89 LBS | RESPIRATION RATE: 18 BRPM | OXYGEN SATURATION: 98 % | HEIGHT: 66 IN | SYSTOLIC BLOOD PRESSURE: 108 MMHG | TEMPERATURE: 98 F | DIASTOLIC BLOOD PRESSURE: 75 MMHG | HEART RATE: 889 BPM

## 2022-05-16 LAB — SARS-COV-2 RNA SPEC QL NAA+PROBE: DETECTED

## 2022-05-16 PROCEDURE — 87635 SARS-COV-2 COVID-19 AMP PRB: CPT

## 2022-05-16 PROCEDURE — 71046 X-RAY EXAM CHEST 2 VIEWS: CPT

## 2022-05-16 PROCEDURE — 71046 X-RAY EXAM CHEST 2 VIEWS: CPT | Mod: 26

## 2022-05-16 PROCEDURE — 99284 EMERGENCY DEPT VISIT MOD MDM: CPT

## 2022-05-16 PROCEDURE — 94640 AIRWAY INHALATION TREATMENT: CPT

## 2022-05-16 PROCEDURE — 99285 EMERGENCY DEPT VISIT HI MDM: CPT | Mod: 25

## 2022-05-16 PROCEDURE — 99285 EMERGENCY DEPT VISIT HI MDM: CPT

## 2022-05-16 RX ORDER — DEXAMETHASONE 0.5 MG/5ML
6 ELIXIR ORAL ONCE
Refills: 0 | Status: COMPLETED | OUTPATIENT
Start: 2022-05-16 | End: 2022-05-16

## 2022-05-16 RX ORDER — ACETAMINOPHEN 500 MG
650 TABLET ORAL ONCE
Refills: 0 | Status: COMPLETED | OUTPATIENT
Start: 2022-05-16 | End: 2022-05-16

## 2022-05-16 RX ORDER — ALBUTEROL 90 UG/1
2.5 AEROSOL, METERED ORAL ONCE
Refills: 0 | Status: COMPLETED | OUTPATIENT
Start: 2022-05-16 | End: 2022-05-16

## 2022-05-16 RX ORDER — ALBUTEROL 90 UG/1
2 AEROSOL, METERED ORAL ONCE
Refills: 0 | Status: COMPLETED | OUTPATIENT
Start: 2022-05-16 | End: 2022-05-16

## 2022-05-16 RX ADMIN — ALBUTEROL 2.5 MILLIGRAM(S): 90 AEROSOL, METERED ORAL at 10:04

## 2022-05-16 RX ADMIN — ALBUTEROL 2 PUFF(S): 90 AEROSOL, METERED ORAL at 12:08

## 2022-05-16 RX ADMIN — Medication 650 MILLIGRAM(S): at 23:53

## 2022-05-16 NOTE — ED PROVIDER NOTE - OBJECTIVE STATEMENT
77 y.o female with no PMHx is presenting with x2 weeks of non-productive cough and body aches. Patient states that cough is throughout the day and denies fever, chest pain, shortness of breath, and headache. Patient reports she recently tested positive for COVID-19, however she states that she took rapid tests several times which came back negative.

## 2022-05-16 NOTE — ED ADULT NURSE NOTE - OBJECTIVE STATEMENT
Pt. c/o cough and fatigue ongoing for 2 weeks. Pt. stated she took at home tests and it came back negative. Pt. denies any fever or shortness of breath.

## 2022-05-16 NOTE — ED PROVIDER NOTE - PATIENT PORTAL LINK FT
You can access the FollowMyHealth Patient Portal offered by [facility] by registering at the following website: [email or web address]. By joining [employer]’s FollowMyHealth portal, you will also be able to view your health information using other applications (apps) compatible with our system.

## 2022-05-16 NOTE — ED PROVIDER NOTE - CLINICAL SUMMARY MEDICAL DECISION MAKING FREE TEXT BOX
77 y.o female with non-productive cough. Suspected viral illness, possible COVID-19, allergies also possible. Will perform chest x-ray, trial of nebulizer, COVID-19 swab, and reassess.

## 2022-05-16 NOTE — ED ADULT TRIAGE NOTE - AS TEMP SITE
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
oral

## 2022-05-16 NOTE — ED ADULT TRIAGE NOTE - CHIEF COMPLAINT QUOTE
tested positive today for covid, noted with low oxygen at home 90-92 % , with headaches, not eating and drinking as per relative

## 2022-05-16 NOTE — ED PROVIDER NOTE - NSFOLLOWUPINSTRUCTIONS_ED_ALL_ED_FT
10 cosas que puede hacer para controlar los síntomas de COVID-19 en casa    10 Things You Can Do to Manage Your COVID-19 Symptoms at Home      Si tiene la infección por COVID-19 posible o confirmada    1. Quédese en alarcon casa, excepto para obtener atención médica.      2. Preste atención a cristian síntomas cuidadosamente. Si cristian síntomas empeoran, llame al médico de inmediato.      3. Descanse y manténgase hidratado.       4.Si tiene brando lisha médica, llame al médico con anticipación e infórmele que tiene o puede tener COVID-19.      5.Si tiene brando emergencia médica, llame al 911 y avise al personal de despacho que tiene o puede tener COVID-19.      6. Al toser y al estornudar, cúbrase la boca y la nariz con un pañuelo descartable o con el pliegue del codo.      7. Lávese las mat con frecuencia con agua y jabón leticia al menos 20 segundos, o sarah límpiese las mat con un desinfectante de mat a base de alcohol que contenga al menos un 60 % de alcohol.      8.En la mayor medida posible, permanezca en brando habitación específica y lejos de otras personas en alarcon hogar. Además, debe utilizar un baño aparte, si es posible. Si necesita estar cerca de otras personas dentro o fuera de la casa, use brando mascarilla.      9. Evite compartir objetos personales con otras personas de la casa, darline platos, toallas y ropa de cama.      10. Limpie todas las superficies que se tocan con frecuencia, darline encimeras, mesas y picaportes. Utilice los aerosoles o las toallitas húmedas de limpieza doméstica según las instrucciones de la etiqueta.      cdc.gov/coronavirus      07/16/2021    Esta información no tiene darline fin reemplazar el consejo del médico. Asegúrese de hacerle al médico cualquier pregunta que tenga.

## 2022-05-17 DIAGNOSIS — J96.01 ACUTE RESPIRATORY FAILURE WITH HYPOXIA: ICD-10-CM

## 2022-05-17 DIAGNOSIS — U07.1 COVID-19: ICD-10-CM

## 2022-05-17 DIAGNOSIS — Z29.9 ENCOUNTER FOR PROPHYLACTIC MEASURES, UNSPECIFIED: ICD-10-CM

## 2022-05-17 LAB
ALBUMIN SERPL ELPH-MCNC: 3 G/DL — LOW (ref 3.5–5)
ALBUMIN SERPL ELPH-MCNC: 3 G/DL — LOW (ref 3.5–5)
ALP SERPL-CCNC: 74 U/L — SIGNIFICANT CHANGE UP (ref 40–120)
ALP SERPL-CCNC: 78 U/L — SIGNIFICANT CHANGE UP (ref 40–120)
ALT FLD-CCNC: 20 U/L DA — SIGNIFICANT CHANGE UP (ref 10–60)
ALT FLD-CCNC: 20 U/L DA — SIGNIFICANT CHANGE UP (ref 10–60)
ANION GAP SERPL CALC-SCNC: 6 MMOL/L — SIGNIFICANT CHANGE UP (ref 5–17)
ANION GAP SERPL CALC-SCNC: 8 MMOL/L — SIGNIFICANT CHANGE UP (ref 5–17)
AST SERPL-CCNC: 14 U/L — SIGNIFICANT CHANGE UP (ref 10–40)
AST SERPL-CCNC: 15 U/L — SIGNIFICANT CHANGE UP (ref 10–40)
BASOPHILS # BLD AUTO: 0.04 K/UL — SIGNIFICANT CHANGE UP (ref 0–0.2)
BASOPHILS NFR BLD AUTO: 0.6 % — SIGNIFICANT CHANGE UP (ref 0–2)
BILIRUB SERPL-MCNC: 0.4 MG/DL — SIGNIFICANT CHANGE UP (ref 0.2–1.2)
BILIRUB SERPL-MCNC: 0.5 MG/DL — SIGNIFICANT CHANGE UP (ref 0.2–1.2)
BUN SERPL-MCNC: 10 MG/DL — SIGNIFICANT CHANGE UP (ref 7–18)
BUN SERPL-MCNC: 13 MG/DL — SIGNIFICANT CHANGE UP (ref 7–18)
CALCIUM SERPL-MCNC: 8.6 MG/DL — SIGNIFICANT CHANGE UP (ref 8.4–10.5)
CALCIUM SERPL-MCNC: 8.6 MG/DL — SIGNIFICANT CHANGE UP (ref 8.4–10.5)
CHLORIDE SERPL-SCNC: 101 MMOL/L — SIGNIFICANT CHANGE UP (ref 96–108)
CHLORIDE SERPL-SCNC: 103 MMOL/L — SIGNIFICANT CHANGE UP (ref 96–108)
CO2 SERPL-SCNC: 26 MMOL/L — SIGNIFICANT CHANGE UP (ref 22–31)
CO2 SERPL-SCNC: 29 MMOL/L — SIGNIFICANT CHANGE UP (ref 22–31)
CREAT SERPL-MCNC: 0.68 MG/DL — SIGNIFICANT CHANGE UP (ref 0.5–1.3)
CREAT SERPL-MCNC: 0.7 MG/DL — SIGNIFICANT CHANGE UP (ref 0.5–1.3)
CRP SERPL-MCNC: 6 MG/L — HIGH
D DIMER BLD IA.RAPID-MCNC: 311 NG/ML DDU — HIGH
EGFR: 90 ML/MIN/1.73M2 — SIGNIFICANT CHANGE UP
EGFR: 91 ML/MIN/1.73M2 — SIGNIFICANT CHANGE UP
EOSINOPHIL # BLD AUTO: 0 K/UL — SIGNIFICANT CHANGE UP (ref 0–0.5)
EOSINOPHIL NFR BLD AUTO: 0 % — SIGNIFICANT CHANGE UP (ref 0–6)
FERRITIN SERPL-MCNC: 168 NG/ML — HIGH (ref 15–150)
GLUCOSE SERPL-MCNC: 114 MG/DL — HIGH (ref 70–99)
GLUCOSE SERPL-MCNC: 185 MG/DL — HIGH (ref 70–99)
HCT VFR BLD CALC: 40.2 % — SIGNIFICANT CHANGE UP (ref 34.5–45)
HCT VFR BLD CALC: 40.5 % — SIGNIFICANT CHANGE UP (ref 34.5–45)
HGB BLD-MCNC: 13.5 G/DL — SIGNIFICANT CHANGE UP (ref 11.5–15.5)
HGB BLD-MCNC: 13.6 G/DL — SIGNIFICANT CHANGE UP (ref 11.5–15.5)
IMM GRANULOCYTES NFR BLD AUTO: 0.4 % — SIGNIFICANT CHANGE UP (ref 0–1.5)
INR BLD: 1.07 RATIO — SIGNIFICANT CHANGE UP (ref 0.88–1.16)
LYMPHOCYTES # BLD AUTO: 0.9 K/UL — LOW (ref 1–3.3)
LYMPHOCYTES # BLD AUTO: 13.3 % — SIGNIFICANT CHANGE UP (ref 13–44)
MCHC RBC-ENTMCNC: 29.8 PG — SIGNIFICANT CHANGE UP (ref 27–34)
MCHC RBC-ENTMCNC: 29.9 PG — SIGNIFICANT CHANGE UP (ref 27–34)
MCHC RBC-ENTMCNC: 33.6 GM/DL — SIGNIFICANT CHANGE UP (ref 32–36)
MCHC RBC-ENTMCNC: 33.6 GM/DL — SIGNIFICANT CHANGE UP (ref 32–36)
MCV RBC AUTO: 88.6 FL — SIGNIFICANT CHANGE UP (ref 80–100)
MCV RBC AUTO: 89.1 FL — SIGNIFICANT CHANGE UP (ref 80–100)
MONOCYTES # BLD AUTO: 0.98 K/UL — HIGH (ref 0–0.9)
MONOCYTES NFR BLD AUTO: 14.4 % — HIGH (ref 2–14)
NEUTROPHILS # BLD AUTO: 4.84 K/UL — SIGNIFICANT CHANGE UP (ref 1.8–7.4)
NEUTROPHILS NFR BLD AUTO: 71.3 % — SIGNIFICANT CHANGE UP (ref 43–77)
NRBC # BLD: 0 /100 WBCS — SIGNIFICANT CHANGE UP (ref 0–0)
NRBC # BLD: 0 /100 WBCS — SIGNIFICANT CHANGE UP (ref 0–0)
PLATELET # BLD AUTO: 169 K/UL — SIGNIFICANT CHANGE UP (ref 150–400)
PLATELET # BLD AUTO: 192 K/UL — SIGNIFICANT CHANGE UP (ref 150–400)
POTASSIUM SERPL-MCNC: 3.9 MMOL/L — SIGNIFICANT CHANGE UP (ref 3.5–5.3)
POTASSIUM SERPL-MCNC: 4.2 MMOL/L — SIGNIFICANT CHANGE UP (ref 3.5–5.3)
POTASSIUM SERPL-SCNC: 3.9 MMOL/L — SIGNIFICANT CHANGE UP (ref 3.5–5.3)
POTASSIUM SERPL-SCNC: 4.2 MMOL/L — SIGNIFICANT CHANGE UP (ref 3.5–5.3)
PROCALCITONIN SERPL-MCNC: 0.06 NG/ML — SIGNIFICANT CHANGE UP (ref 0.02–0.1)
PROT SERPL-MCNC: 6.8 G/DL — SIGNIFICANT CHANGE UP (ref 6–8.3)
PROT SERPL-MCNC: 7.2 G/DL — SIGNIFICANT CHANGE UP (ref 6–8.3)
PROTHROM AB SERPL-ACNC: 12.7 SEC — SIGNIFICANT CHANGE UP (ref 10.5–13.4)
RBC # BLD: 4.51 M/UL — SIGNIFICANT CHANGE UP (ref 3.8–5.2)
RBC # BLD: 4.57 M/UL — SIGNIFICANT CHANGE UP (ref 3.8–5.2)
RBC # FLD: 12.2 % — SIGNIFICANT CHANGE UP (ref 10.3–14.5)
RBC # FLD: 12.3 % — SIGNIFICANT CHANGE UP (ref 10.3–14.5)
SARS-COV-2 RNA SPEC QL NAA+PROBE: DETECTED
SODIUM SERPL-SCNC: 135 MMOL/L — SIGNIFICANT CHANGE UP (ref 135–145)
SODIUM SERPL-SCNC: 138 MMOL/L — SIGNIFICANT CHANGE UP (ref 135–145)
WBC # BLD: 3.43 K/UL — LOW (ref 3.8–10.5)
WBC # BLD: 6.79 K/UL — SIGNIFICANT CHANGE UP (ref 3.8–10.5)
WBC # FLD AUTO: 3.43 K/UL — LOW (ref 3.8–10.5)
WBC # FLD AUTO: 6.79 K/UL — SIGNIFICANT CHANGE UP (ref 3.8–10.5)

## 2022-05-17 PROCEDURE — 99223 1ST HOSP IP/OBS HIGH 75: CPT

## 2022-05-17 PROCEDURE — 12345: CPT | Mod: NC

## 2022-05-17 PROCEDURE — 71045 X-RAY EXAM CHEST 1 VIEW: CPT | Mod: 26

## 2022-05-17 RX ORDER — REMDESIVIR 5 MG/ML
200 INJECTION INTRAVENOUS EVERY 24 HOURS
Refills: 0 | Status: COMPLETED | OUTPATIENT
Start: 2022-05-17 | End: 2022-05-17

## 2022-05-17 RX ORDER — CHOLECALCIFEROL (VITAMIN D3) 125 MCG
1 CAPSULE ORAL
Qty: 0 | Refills: 0 | DISCHARGE

## 2022-05-17 RX ORDER — DEXAMETHASONE 0.5 MG/5ML
6 ELIXIR ORAL ONCE
Refills: 0 | Status: COMPLETED | OUTPATIENT
Start: 2022-05-17 | End: 2022-05-17

## 2022-05-17 RX ORDER — ENOXAPARIN SODIUM 100 MG/ML
40 INJECTION SUBCUTANEOUS EVERY 24 HOURS
Refills: 0 | Status: DISCONTINUED | OUTPATIENT
Start: 2022-05-17 | End: 2022-05-18

## 2022-05-17 RX ORDER — ALBUTEROL 90 UG/1
2 AEROSOL, METERED ORAL EVERY 4 HOURS
Refills: 0 | Status: DISCONTINUED | OUTPATIENT
Start: 2022-05-17 | End: 2022-05-18

## 2022-05-17 RX ORDER — ACETAMINOPHEN 500 MG
1 TABLET ORAL
Qty: 0 | Refills: 0 | DISCHARGE

## 2022-05-17 RX ORDER — COLCHICINE 0.6 MG
1 TABLET ORAL
Qty: 0 | Refills: 0 | DISCHARGE

## 2022-05-17 RX ORDER — ONDANSETRON 8 MG/1
4 TABLET, FILM COATED ORAL EVERY 8 HOURS
Refills: 0 | Status: DISCONTINUED | OUTPATIENT
Start: 2022-05-17 | End: 2022-05-18

## 2022-05-17 RX ORDER — SODIUM CHLORIDE 9 MG/ML
1000 INJECTION, SOLUTION INTRAVENOUS
Refills: 0 | Status: DISCONTINUED | OUTPATIENT
Start: 2022-05-17 | End: 2022-05-18

## 2022-05-17 RX ORDER — ACETAMINOPHEN 500 MG
650 TABLET ORAL EVERY 6 HOURS
Refills: 0 | Status: DISCONTINUED | OUTPATIENT
Start: 2022-05-17 | End: 2022-05-18

## 2022-05-17 RX ORDER — PANTOPRAZOLE SODIUM 20 MG/1
40 TABLET, DELAYED RELEASE ORAL
Refills: 0 | Status: DISCONTINUED | OUTPATIENT
Start: 2022-05-17 | End: 2022-05-18

## 2022-05-17 RX ORDER — REMDESIVIR 5 MG/ML
INJECTION INTRAVENOUS
Refills: 0 | Status: DISCONTINUED | OUTPATIENT
Start: 2022-05-17 | End: 2022-05-18

## 2022-05-17 RX ORDER — DEXAMETHASONE 0.5 MG/5ML
6 ELIXIR ORAL DAILY
Refills: 0 | Status: DISCONTINUED | OUTPATIENT
Start: 2022-05-18 | End: 2022-05-18

## 2022-05-17 RX ORDER — GUAIFENESIN/DEXTROMETHORPHAN 600MG-30MG
10 TABLET, EXTENDED RELEASE 12 HR ORAL EVERY 4 HOURS
Refills: 0 | Status: DISCONTINUED | OUTPATIENT
Start: 2022-05-17 | End: 2022-05-18

## 2022-05-17 RX ORDER — REMDESIVIR 5 MG/ML
100 INJECTION INTRAVENOUS EVERY 24 HOURS
Refills: 0 | Status: DISCONTINUED | OUTPATIENT
Start: 2022-05-18 | End: 2022-05-18

## 2022-05-17 RX ADMIN — ENOXAPARIN SODIUM 40 MILLIGRAM(S): 100 INJECTION SUBCUTANEOUS at 06:02

## 2022-05-17 RX ADMIN — REMDESIVIR 500 MILLIGRAM(S): 5 INJECTION INTRAVENOUS at 10:38

## 2022-05-17 RX ADMIN — PANTOPRAZOLE SODIUM 40 MILLIGRAM(S): 20 TABLET, DELAYED RELEASE ORAL at 06:06

## 2022-05-17 RX ADMIN — Medication 650 MILLIGRAM(S): at 05:28

## 2022-05-17 RX ADMIN — Medication 6 MILLIGRAM(S): at 01:52

## 2022-05-17 RX ADMIN — SODIUM CHLORIDE 75 MILLILITER(S): 9 INJECTION, SOLUTION INTRAVENOUS at 06:02

## 2022-05-17 NOTE — ED PROVIDER NOTE - CLINICAL SUMMARY MEDICAL DECISION MAKING FREE TEXT BOX
Pt returned to ED for symptoatioc COVID 19 infection. Pt to be admitted for IV decadron, suppleme Pt returned to ED for symptomatic COVID 19 infection. Pt to be admitted for IV decadron, supplemental O2 and respiratory monitoring.  MAR and Dr. Kinsey endorsed. Pt agrees with admission. I had a detailed discussion with the patient and/or guardian regarding the historical points, exam findings, and any diagnostic results supporting the admit diagnosis.

## 2022-05-17 NOTE — PATIENT PROFILE ADULT - NSPROMEDSBROUGHTTOHOSP_GEN_A_NUR
05/29/19 1600   Events/Summary/Plan   Events/Summary/Plan Tx given   Non-Invasive Resp Device Site Inspection Completed Intact   Interdisciplinary Plan of Care-Goals (Indications)   Bronchodilator Indications Strong Subjective / Objective Improvement   Interdisciplinary Plan of Care-Outcomes    Bronchodilator Outcome Diminished Wheezing and Volume of Air Movement Increased   Education   Education Yes - Pt. / Family has been Instructed in use of Respiratory Medications and Adverse Reactions   RT Assessment of Delivered Medications   Evaluation of Medication Delivery Daily Yes-- Pt /Family has been Instructed in use of Respiratory Medications and Adverse Reactions   SVN Group   #SVN Performed Yes   Given By: Mouthpiece   Date SVN Last Changed 05/29/19   Date SVN Next Change Due (Q 7 Days) 06/05/19   Chest Exam   Respiration 16   Pulse (!) 108   Breath Sounds   Pre/Post Intervention Post Intervention Assessment   RUL Breath Sounds Clear   RML Breath Sounds Clear   RLL Breath Sounds Diminished   NADEEN Breath Sounds Clear   LLL Breath Sounds Diminished   Oximetry   #Pulse Oximetry (Single Determination) Yes   Oxygen   Home O2 Use Prior To Admission? No   Pulse Oximetry 92 %   O2 (LPM) 0   O2 Daily Delivery Respiratory  Room Air with O2 Available     
no

## 2022-05-17 NOTE — H&P ADULT - PROBLEM SELECTOR PLAN 1
Pt with recently diagnosed COVID, consistently saturating 92% on room air   CXR clear of ground glass infiltrates   COVID +ve, F/u full RVP  D-dimer 311; f/u covid markers   Supplement 02 and wean as able   C/w Dexamethasone IVP 6mg x 10 days   Symptomatic treatment with Tylenol, Robitussin

## 2022-05-17 NOTE — ED PROVIDER NOTE - CARDIAC, MLM
Normal rate, regular rhythm.  Heart sounds S1, S2.  No murmurs, rubs or gallops. Normal rate, regular rhythm.  Heart sounds S1, S2.  No murmurs, rubs or gallops. HR 67 bpm

## 2022-05-17 NOTE — H&P ADULT - ASSESSMENT
Patient is a 76 y/o female, from home, with significant medical history of gout presented to the ED with complaints of worsening non-productive cough, fevers, and weakness x 2 weeks. Admitted for acute hypoxic respiratory failure 2/2 to COVID.

## 2022-05-17 NOTE — H&P ADULT - ATTENDING COMMENTS
Pt seen at bedside   Case discussed with MAR.  77 year old woman with no significant medical hx here with fever, cough, generalized weakness over 1 week.    Vital Signs Last 24 Hrs  T(C): 36.6 (17 May 2022 02:30), Max: 36.8 (16 May 2022 08:37)  T(F): 97.8 (17 May 2022 02:30), Max: 98.2 (16 May 2022 08:37)  HR: 89 (17 May 2022 02:30) (89 - 98)  BP: 121/81 (17 May 2022 02:30) (108/75 - 137/80)  RR: 18 (17 May 2022 02:30) (16 - 18)  SpO2: 97% (17 May 2022 02:30) (97% - 98%)                          13.6   6.79  )-----------( 192      ( 17 May 2022 00:27 )             40.5     05-17    135  |  101  |  13  ----------------------------<  114<H>  4.2   |  26  |  0.68    Ca    8.6      17 May 2022 00:27    TPro  7.2  /  Alb  3.0<L>  /  TBili  0.5  /  DBili  x   /  AST  15  /  ALT  20  /  AlkPhos  78  05-17    COVID-19 PCR: Detected (17 May 2022 00:27)  COVID-19 PCR: Detected (16 May 2022 10:07)    CXR - unremarkable     Impression   77 year old man initially seen in the ED yesterday for respiratory symptoms and diagnosed with mild covid 19 pneumonia but returns today with acute respiratory failure with hypoxia requiring admission.    A/P  Acute respiratory failure with hypoxia  Covid 19 pneumonia    Plan   - Admit to Medicine   - Supplemental O2 to keep SaO2 > 94%  Dexamethasone 6mg IV daily X 10 days  No indication for remdesivir protocol  Supportive care - antipyretics, antitussives, bronchodilators as needed.  DVT ppx - lovenox 40mg s/c daily Pt seen at bedside   Case discussed with MAR.  77 year old woman with no significant medical hx here with fever, cough, generalized weakness over 1 week.    Vital Signs Last 24 Hrs  T(C): 36.6 (17 May 2022 02:30), Max: 36.8 (16 May 2022 08:37)  T(F): 97.8 (17 May 2022 02:30), Max: 98.2 (16 May 2022 08:37)  HR: 89 (17 May 2022 02:30) (89 - 98)  BP: 121/81 (17 May 2022 02:30) (108/75 - 137/80)  RR: 18 (17 May 2022 02:30) (16 - 18)  SpO2: 97% (17 May 2022 02:30) (97% - 98%)    Exam as above                        13.6   6.79  )-----------( 192      ( 17 May 2022 00:27 )             40.5     05-17    135  |  101  |  13  --------------------<  114<H>  4.2   |  26  |  0.68    Ca    8.6      17 May 2022 00:27    TPro  7.2  /  Alb  3.0<L>  /  TBili  0.5  /  DBili  x   /  AST  15  /  ALT  20  /  AlkPhos  78  05-17    COVID-19 PCR: Detected (17 May 2022 00:27)  COVID-19 PCR: Detected (16 May 2022 10:07)    CXR - unremarkable     Impression   77 year old man initially seen in the ED yesterday for respiratory symptoms and diagnosed with mild covid 19 pneumonia but returns today with acute respiratory failure with hypoxia requiring admission.    A/P  Acute respiratory failure with hypoxia   Covid 19 pneumonia    Plan   - Admit to Medicine   - Supplemental O2 to keep SaO2 > 94%   Dexamethasone 6mg IV daily X 10 days  No indication for remdesivir protocol  Supportive care - antipyretics, antitussives, bronchodilators as needed.  DVT ppx - Lovenox 40mg s/c daily    NB   Re-evaluation at 7am   Pt easily weaned off supplemental O2; with Sao2 about 95-96% on room air  Will discontinue steroid therapy and continue supportive care only

## 2022-05-17 NOTE — PATIENT PROFILE ADULT - LANGUAGE ASSISTANCE NEEDED
2 Norco 5 mg given po for pain rated 6/10. Yes-Patient/Caregiver accepts free interpretation services...

## 2022-05-17 NOTE — H&P ADULT - NSHPPHYSICALEXAM_GEN_ALL_CORE
"Pt states her lips and oral region began swelling for suddenly at home before EMS arrived. Pt brought with her mucinex DM and states she took one this morning and felt fine and took another dose \"sometime this afternoon\". Pt is unsure if she is having an allergic rxn to the mucinex or what. Pt denies SOA at the time and now and is speaking clearly and logical. None, if minimal swelling noted to lips.      Angela Quiros, RN  08/15/18 1922    " GENERAL APPEARANCE: Well developed  female, AA0x3, in NAD on 2L NC   HEENT: Normocephalic, PERRL, EOMI External auditory canals clear, hearing grossly intact, no nasal discharge   THROAT: Oral cavity and pharynx normal. No inflammation, swelling, exudate, or lesions.  CARDIAC: Normal S1 and S2. No S3, S4 or murmurs. Rhythm is regular. There is no peripheral edema, cyanosis or pallor.  LUNGS: Clear to auscultation and percussion without lower zone rales + No rhonchi, wheezing or diminished breath sounds.  ABDOMEN: Positive bowel sounds. Soft, nondistended, nontender. No guarding or rebound. No masses.  EXTREMITIES: No significant deformity or joint abnormality. No edema. Peripheral pulses intact. No varicosities.  NEUROLOGICAL: CN II-XII intact. Strength and sensation symmetric and intact throughout. Reflexes 2+ throughout.   SKIN: No skin breakdown, lesions or rashes noted

## 2022-05-17 NOTE — ED ADULT NURSE NOTE - CHIEF COMPLAINT QUOTE
Calcipotriene Pregnancy And Lactation Text: This medication has not been proven safe during pregnancy. It is unknown if this medication is excreted in breast milk. tested positive today for covid, noted with low oxygen at home 90-92 % , with headaches, not eating and drinking as per relative

## 2022-05-17 NOTE — ED ADULT NURSE NOTE - NSFALLRSKPASTHIST_ED_ALL_ED
Acute Care - Speech Language Pathology   Swallow Treatment Note [facility]     Patient Name: Joann Finnegan  : 1950  MRN: [id number]  Today's Date: 2021               Admit Date: 2020  Pt is alert, but tired. Attempted denture placement again using denture adhesive, but still unable to get them to stay in place. Completed trials of pureed with 2x delayed cough prior to initiation of voicing. Vocal quality continues to be hoarse, but is slowly improving. Spoke with infection control and radiology. As pt is out of enhanced isolation precautions she is eligible to be transported to radiology for a modified barium swallow study. Educated pt and daughter on MBS procedure and answered questions re: potential initiation of diet following MBS. Continue NPO except for ice chips until MBS can be completed later today. Pt verbalized agreement with SLP calling her daughter Jaelyn with results of MBS if she is no longer at bedside.   Litzy Mane CCC-SLP 2021 09:17 CST    Visit Dx:     ICD-10-CM ICD-9-CM   1. Pneumonia due to COVID-19 virus  U07.1 480.8    J12.89    2. Hypoxic  R09.02 799.02   3. Acute respiratory failure with hypoxia (CMS/HCC)  J96.01 518.81   4. Impaired mobility  Z74.09 799.89   5. Impaired mobility and ADLs  Z74.09 V49.89    Z78.9    6. Oropharyngeal dysphagia  R13.12 787.22     Patient Active Problem List   Diagnosis   • Dysphagia, oropharyngeal   • Heartburn   • Family hx colonic polyps   • Pneumonia due to COVID-19 virus   • Acute respiratory failure with hypoxia (CMS/HCC)   • Obesity (BMI 30-39.9)   • Elevated ferritin and CRP   • Acute respiratory distress syndrome (ARDS) due to COVID-19 virus (CMS/HCC)   • Elevated LFTs   • Leukocytosis   • Thrombocytopenia (CMS/HCC)   • Aphonia   • Xerostomia   • Post viral debility     Past Medical History:   Diagnosis Date   • Abnormal weight gain    • Arthritis    • Breast cancer (CMS/HCC)     right.    • Fatigue    • H/O melanoma  excision     CLARKS  LEVEL III      BACK OF UPPER LEFT ARM   • Hypertension    • Hypothyroidism    • Melanoma (CMS/HCC)    • Restless leg syndrome    • Shortness of breath      Past Surgical History:   Procedure Laterality Date   • APPENDECTOMY     • BREAST BIOPSY Right    • BREAST BIOPSY     • BREAST LUMPECTOMY      right.    • BROW LIFT     • COLONOSCOPY     • COLONOSCOPY N/A 2/14/2020    Procedure: COLONOSCOPY WITH ANESTHESIA;  Surgeon: Donovan Hernandez MD;  Location: [facility] ENDOSCOPY;  Service: Gastroenterology;  Laterality: N/A;  pre: family hx colon polyps  post: polyps  Janak Sherwood APRN   • ENDOSCOPY N/A 2/14/2020    Procedure: ESOPHAGOGASTRODUODENOSCOPY WITH ANESTHESIA;  Surgeon: Donovan Hernandez MD;  Location: [facility] ENDOSCOPY;  Service: Gastroenterology;  Laterality: N/A;  pre: dysphagia; heartburn  post: dilated  Janak Sherwood APRN   • ENDOSCOPY AND COLONOSCOPY  12/22/2011    very minimal distal esophagitis, incomplete esophagus ring dilated   • LUMBAR SPINAL TUMOR REMOVAL      Spinal Cyst removed from Spinal Canal   • MASTECTOMY, PARTIAL     • PARTIAL HIP ARTHROPLASTY Left    • SKIN CANCER EXCISION      Melanoma    • SQUAMOUS CELL CARCINOMA EXCISION     • TEMPOROMANDIBULAR JOINT ARTHROPLASTY     • TOTAL ABDOMINAL HYSTERECTOMY WITH SALPINGO OOPHORECTOMY Bilateral         SWALLOW EVALUATION (last 72 hours)      SLP Adult Swallow Evaluation     Row Name 01/06/21 0817 01/05/21 0915 01/04/21 1231 01/03/21 1025          Rehab Evaluation    Document Type  therapy note (daily note)  -MB  therapy note (daily note)  -MB  therapy note (daily note)  -MB  therapy note (daily note)  -BN     Subjective Information  complains of;fatigue  -MB  no complaints  -MB  no complaints  -MB  no complaints  -BN     Patient Observations  alert;cooperative  -MB  alert;cooperative  -MB  alert;cooperative  -MB  alert;cooperative  -BN     Patient/Family/Caregiver Comments/Observations  Daughter present  -MB    present  -MB  No family present  -MB  pt  present  -BN     Care Plan Review  --  --  --  care plan/treatment goals reviewed;risks/benefits reviewed;current/potential barriers reviewed;patient/other agree to care plan  -BN     Care Plan Review, Other Participant(s)  --  --  --  significant other  -BN     Patient Effort  good  -MB  good  -MB  good  -MB  good  -BN        Pain    Additional Documentation  --  --  Pain Scale: FACES Pre/Post-Treatment (Group)  -MB  --        Pain Scale: Numbers Pre/Post-Treatment    Pretreatment Pain Rating  --  --  --  0/10 - no pain  -BN     Posttreatment Pain Rating  --  --  --  0/10 - no pain  -BN        Pain Scale: FACES Pre/Post-Treatment    Pain: FACES Scale, Pretreatment  0-->no hurt  -MB  0-->no hurt  -MB  0-->no hurt  -MB  --        Clinical Impression    Daily Summary of Progress (SLP)  progress toward functional goals is good  -MB  progress toward functional goals as expected  -MB  progress towards functional goals is fair  -MB  progress toward functional goals is gradual  -BN     Barriers to Overall Progress (SLP)  Ill-fitting dentures  -MB  Weak voice  -MB  Weakness  -MB  medically complex  -BN     Plan for Continued Treatment (SLP)  MBS today  -MB  Plan for potential FEES tomorrow  -MB  Continue to follow  -MB  continue to follow  -BN        Swallow Goals (SLP)    Oral Nutrition/Hydration Goal Selection (SLP)  --  --  --  oral nutrition/hydration, SLP goal 1  -BN     Labial Strengthening Goal Selection (SLP)  --  --  --  labial strengthening, SLP goal 1  -BN     Lingual Strengthening Goal Selection (SLP)  --  --  --  lingual strengthening, SLP goal 1  -BN     Pharyngeal Strengthening Exercise Goal Selection (SLP)  --  --  --  pharyngeal strengthening exercise, SLP goal 1  -BN     Additional Documentation  --  --  --  labial strengthening goal selection (SLP);lingual strengthening goal selection (SLP);pharyngeal strengthening exercise goal selection (SLP)  -BN         Oral Nutrition/Hydration Goal 1 (SLP)    Oral Nutrition/Hydration Goal 1, SLP  Pt will tolerate least restrictive diet with no overt s/s of aspiration.   -MB  Pt will tolerate least restrictive diet with no overt s/s of aspiration.   -MB  Pt will tolerate least restrictive diet with no overt s/s of aspiration.   -MB  Pt will tolerate least restrictive diet with no overt s/s of aspiration.   -BN     Time Frame (Oral Nutrition/Hydration Goal 1, SLP)  by discharge  -MB  by discharge  -MB  by discharge  -MB  by discharge  -BN     Barriers (Oral Nutrition/Hydration Goal 1, SLP)  Ill-fitting dentures  -MB  Weak voice  -MB  Aphonic  -MB  Medically complex, weak, lethargic  -BN     Progress/Outcomes (Oral Nutrition/Hydration Goal 1, SLP)  continuing progress toward goal  -MB  continuing progress toward goal  -MB  continuing progress toward goal  -MB  progress slower than expected  -BN        Labial Strengthening Goal 1 (SLP)    Activity (Labial Strengthening Goal 1, SLP)  increase labial tone  -MB  increase labial tone  -MB  increase labial tone  -MB  increase labial tone  -BN     Increase Labial Tone  labial resistance exercises  -MB  labial resistance exercises  -MB  labial resistance exercises  -MB  labial resistance exercises  -BN     [place]/Accuracy (Labial Strengthening Goal 1, SLP)  with minimal cues (75-90% accuracy)  -MB  with minimal cues (75-90% accuracy)  -MB  with minimal cues (75-90% accuracy)  -MB  with minimal cues (75-90% accuracy)  -BN     Time Frame (Labial Strengthening Goal 1, SLP)  short term goal (STG);by discharge  -MB  short term goal (STG);by discharge  -MB  short term goal (STG);by discharge  -MB  short term goal (STG);by discharge  -BN     Barriers (Labial Strengthening Goal 1, SLP)  --  --  --  n/a  -BN     Progress/Outcomes (Labial Strengthening Goal 1, SLP)  --  --  --  continuing progress toward goal  -BN        Lingual Strengthening Goal 1 (SLP)    Activity (Lingual Strengthening Goal  1, SLP)  increase lingual tone/sensation/control/coordination/movement  -MB  increase lingual tone/sensation/control/coordination/movement  -MB  increase lingual tone/sensation/control/coordination/movement  -MB  increase lingual tone/sensation/control/coordination/movement  -BN     Increase Lingual Tone/Sensation/Control/Coordination/Movement  lingual movement exercises  -MB  lingual movement exercises  -MB  lingual movement exercises  -MB  lingual movement exercises  -BN     [place]/Accuracy (Lingual Strengthening Goal 1, SLP)  with minimal cues (75-90% accuracy)  -MB  with minimal cues (75-90% accuracy)  -MB  with minimal cues (75-90% accuracy)  -MB  with minimal cues (75-90% accuracy)  -BN     Time Frame (Lingual Strengthening Goal 1, SLP)  short term goal (STG);by discharge  -MB  short term goal (STG);by discharge  -MB  short term goal (STG);by discharge  -MB  short term goal (STG);by discharge  -BN     Barriers (Lingual Strengthening Goal 1, SLP)  --  Weakness  -MB  Weakness  -MB  n/a  -BN     Progress/Outcomes (Lingual Strengthening Goal 1, SLP)  --  continuing progress toward goal  -MB  continuing progress toward goal  -MB  continuing progress toward goal  -BN        Pharyngeal Strengthening Exercise Goal 1 (SLP)    Activity (Pharyngeal Strengthening Goal 1, SLP)  increase timing  -MB  increase timing  -MB  increase timing  -MB  increase timing  -BN     Increase Timing  gustatory stimulation (sour/cold)  -MB  gustatory stimulation (sour/cold)  -MB  gustatory stimulation (sour/cold)  -MB  gustatory stimulation (sour/cold)  -BN     [place]/Accuracy (Pharyngeal Strengthening Goal 1, SLP)  independently (over 90% accuracy)  -MB  independently (over 90% accuracy)  -MB  independently (over 90% accuracy)  -MB  independently (over 90% accuracy)  -BN     Time Frame (Pharyngeal Strengthening Goal 1, SLP)  short term goal (STG);by discharge  -MB  short term goal (STG);by discharge  -MB  short term goal  (STG);by discharge  -MB  short term goal (STG);by discharge  -BN     Barriers (Pharyngeal Strengthening Goal 1, SLP)  --  --  --  n/a  -BN     Progress/Outcomes (Pharyngeal Strengthening Goal 1, SLP)  --  --  --  goal ongoing  -BN       User Key  (r) = Recorded By, (t) = Taken By, (c) = Cosigned By    Initials Name Effective Dates    Litzy Pimentel, CCC-SLP 08/02/16 -     Marquita Olvera, CCC-SLP 02/11/20 -           EDUCATION  The patient has been educated in the following areas:   Dysphagia (Swallowing Impairment).    SLP Recommendation and Plan                                         Daily Summary of Progress (SLP): progress toward functional goals is good    Plan for Continued Treatment (SLP): MBS today              Plan of Care Reviewed With: patient, daughter  Progress: improving  Outcome Summary: See note    SLP GOALS     Row Name 01/06/21 0817 01/05/21 0915 01/04/21 1231       Oral Nutrition/Hydration Goal 1 (SLP)    Oral Nutrition/Hydration Goal 1, SLP  Pt will tolerate least restrictive diet with no overt s/s of aspiration.   -MB  Pt will tolerate least restrictive diet with no overt s/s of aspiration.   -MB  Pt will tolerate least restrictive diet with no overt s/s of aspiration.   -MB    Time Frame (Oral Nutrition/Hydration Goal 1, SLP)  by discharge  -MB  by discharge  -MB  by discharge  -MB    Barriers (Oral Nutrition/Hydration Goal 1, SLP)  Ill-fitting dentures  -MB  Weak voice  -MB  Aphonic  -MB    Progress/Outcomes (Oral Nutrition/Hydration Goal 1, SLP)  continuing progress toward goal  -MB  continuing progress toward goal  -MB  continuing progress toward goal  -MB       Labial Strengthening Goal 1 (SLP)    Activity (Labial Strengthening Goal 1, SLP)  increase labial tone  -MB  increase labial tone  -MB  increase labial tone  -MB    Increase Labial Tone  labial resistance exercises  -MB  labial resistance exercises  -MB  labial resistance exercises  -MB    [place]/Accuracy  (Labial Strengthening Goal 1, SLP)  with minimal cues (75-90% accuracy)  -MB  with minimal cues (75-90% accuracy)  -MB  with minimal cues (75-90% accuracy)  -MB    Time Frame (Labial Strengthening Goal 1, SLP)  short term goal (STG);by discharge  -MB  short term goal (STG);by discharge  -MB  short term goal (STG);by discharge  -MB       Lingual Strengthening Goal 1 (SLP)    Activity (Lingual Strengthening Goal 1, SLP)  increase lingual tone/sensation/control/coordination/movement  -MB  increase lingual tone/sensation/control/coordination/movement  -MB  increase lingual tone/sensation/control/coordination/movement  -MB    Increase Lingual Tone/Sensation/Control/Coordination/Movement  lingual movement exercises  -MB  lingual movement exercises  -MB  lingual movement exercises  -MB    [place]/Accuracy (Lingual Strengthening Goal 1, SLP)  with minimal cues (75-90% accuracy)  -MB  with minimal cues (75-90% accuracy)  -MB  with minimal cues (75-90% accuracy)  -MB    Time Frame (Lingual Strengthening Goal 1, SLP)  short term goal (STG);by discharge  -MB  short term goal (STG);by discharge  -MB  short term goal (STG);by discharge  -MB    Barriers (Lingual Strengthening Goal 1, SLP)  --  Weakness  -MB  Weakness  -MB    Progress/Outcomes (Lingual Strengthening Goal 1, SLP)  --  continuing progress toward goal  -MB  continuing progress toward goal  -MB       Pharyngeal Strengthening Exercise Goal 1 (SLP)    Activity (Pharyngeal Strengthening Goal 1, SLP)  increase timing  -MB  increase timing  -MB  increase timing  -MB    Increase Timing  gustatory stimulation (sour/cold)  -MB  gustatory stimulation (sour/cold)  -MB  gustatory stimulation (sour/cold)  -MB    [place]/Accuracy (Pharyngeal Strengthening Goal 1, SLP)  independently (over 90% accuracy)  -MB  independently (over 90% accuracy)  -MB  independently (over 90% accuracy)  -MB    Time Frame (Pharyngeal Strengthening Goal 1, SLP)  short term goal (STG);by  discharge  -MB  short term goal (STG);by discharge  -MB  short term goal (STG);by discharge  -MB    Row Name 01/03/21 1025             Oral Nutrition/Hydration Goal 1 (SLP)    Oral Nutrition/Hydration Goal 1, SLP  Pt will tolerate least restrictive diet with no overt s/s of aspiration.   -BN      Time Frame (Oral Nutrition/Hydration Goal 1, SLP)  by discharge  -BN      Barriers (Oral Nutrition/Hydration Goal 1, SLP)  Medically complex, weak, lethargic  -BN      Progress/Outcomes (Oral Nutrition/Hydration Goal 1, SLP)  progress slower than expected  -BN         Labial Strengthening Goal 1 (SLP)    Activity (Labial Strengthening Goal 1, SLP)  increase labial tone  -BN      Increase Labial Tone  labial resistance exercises  -BN      [place]/Accuracy (Labial Strengthening Goal 1, SLP)  with minimal cues (75-90% accuracy)  -BN      Time Frame (Labial Strengthening Goal 1, SLP)  short term goal (STG);by discharge  -BN      Barriers (Labial Strengthening Goal 1, SLP)  n/a  -BN      Progress/Outcomes (Labial Strengthening Goal 1, SLP)  continuing progress toward goal  -BN         Lingual Strengthening Goal 1 (SLP)    Activity (Lingual Strengthening Goal 1, SLP)  increase lingual tone/sensation/control/coordination/movement  -BN      Increase Lingual Tone/Sensation/Control/Coordination/Movement  lingual movement exercises  -BN      [place]/Accuracy (Lingual Strengthening Goal 1, SLP)  with minimal cues (75-90% accuracy)  -BN      Time Frame (Lingual Strengthening Goal 1, SLP)  short term goal (STG);by discharge  -BN      Barriers (Lingual Strengthening Goal 1, SLP)  n/a  -BN      Progress/Outcomes (Lingual Strengthening Goal 1, SLP)  continuing progress toward goal  -BN         Pharyngeal Strengthening Exercise Goal 1 (SLP)    Activity (Pharyngeal Strengthening Goal 1, SLP)  increase timing  -BN      Increase Timing  gustatory stimulation (sour/cold)  -BN      [place]/Accuracy (Pharyngeal Strengthening Goal 1,  SLP)  independently (over 90% accuracy)  -BN      Time Frame (Pharyngeal Strengthening Goal 1, SLP)  short term goal (STG);by discharge  -BN      Barriers (Pharyngeal Strengthening Goal 1, SLP)  n/a  -BN      Progress/Outcomes (Pharyngeal Strengthening Goal 1, SLP)  goal ongoing  -BN        User Key  (r) = Recorded By, (t) = Taken By, (c) = Cosigned By    Initials Name Provider Type    Litzy Pimentel CCC-SLP Speech and Language Pathologist    Marquita Olvera CCC-SLP Speech and Language Pathologist             Time Calculation:   Time Calculation- SLP     Row Name 01/06/21 0914             Time Calculation- SLP    SLP Start Time  0817  -MB      SLP Stop Time  0911  -MB      SLP Time Calculation (min)  54 min  -MB      SLP Received On  01/06/21  -MB        User Key  (r) = Recorded By, (t) = Taken By, (c) = Cosigned By    Initials Name Provider Type    Litzy Pimentel CCC-SLP Speech and Language Pathologist          Therapy Charges for Today     Code Description Service Date Service Provider Modifiers Qty    44546306624 HC ST TREATMENT SWALLOW 2 1/5/2021 Litzy Mane CCC-SLP GN 1    [id number] HC ST TREATMENT SWALLOW 4 1/6/2021 Litzy Mane CCC-SLP GN 1               Litzy CULVER. SILVINA Mane  1/6/2021   no

## 2022-05-17 NOTE — ED ADULT NURSE NOTE - OBJECTIVE STATEMENT
c/o fever, chills, nonproductive coughing and progressive weakness. .bld. drawn and sent to labs.medication given as ordered

## 2022-05-17 NOTE — H&P ADULT - HISTORY OF PRESENT ILLNESS
Patient is a 76 y/o female, from home, with significant medical history of gout  Patient is a 78 y/o female, from home, with significant medical history of gout presented to the ED with complaints of worsening non-productive cough, fevers, and weakness x 2 weeks. She had visited the ED one day PTA and returned as her oxygen saturation at rest fell to 92% consistently. Her  initially tested positive 2 weeks ago after which she started developing fevers, a hacking cough, sweats, and fatigue on daily activities. She denies chest pain, diarrhea, LOC/syncope, falls, palpitations, lower extremity swelling, skin rash, or recent travel.

## 2022-05-17 NOTE — ED PROVIDER NOTE - OBJECTIVE STATEMENT
Chief complaint of fever, chills, nonproductive coughing and progressive weakness. Denies chest pain.  Pt tested +COVID today and discharged with instructions for supportive care. . Daughter states at home pulse ox decreased to 92% on RA, prompting pt to return to ED.  Meds Colchicine

## 2022-05-17 NOTE — CHART NOTE - NSCHARTNOTEFT_GEN_A_CORE
Patient seen and examined with ACP Arianna who translated in Indonesian    Patient with no significant PMH admitted with SOB noted to be COVID positive and mildly hypoxic  admitted for Acute Hypoxic Resp. failure due to COVID  Continue Steroids; Add Remdesevir while Inhouse (no contraindications at this point and given hypoxia on admission)   Taper down oxygen; remove O2 appear to be comfortable  D/C Plan 1-2 days based on clinical stability and Oxygenation with ambulation  Discussed with patient with ACP translating

## 2022-05-18 VITALS
RESPIRATION RATE: 18 BRPM | HEART RATE: 78 BPM | TEMPERATURE: 99 F | OXYGEN SATURATION: 96 % | DIASTOLIC BLOOD PRESSURE: 60 MMHG | SYSTOLIC BLOOD PRESSURE: 102 MMHG

## 2022-05-18 LAB
ALBUMIN SERPL ELPH-MCNC: 2.9 G/DL — LOW (ref 3.5–5)
ALP SERPL-CCNC: 71 U/L — SIGNIFICANT CHANGE UP (ref 40–120)
ALT FLD-CCNC: 19 U/L DA — SIGNIFICANT CHANGE UP (ref 10–60)
ANION GAP SERPL CALC-SCNC: 8 MMOL/L — SIGNIFICANT CHANGE UP (ref 5–17)
AST SERPL-CCNC: 14 U/L — SIGNIFICANT CHANGE UP (ref 10–40)
BILIRUB DIRECT SERPL-MCNC: <0.1 MG/DL — SIGNIFICANT CHANGE UP (ref 0–0.3)
BILIRUB INDIRECT FLD-MCNC: >0.2 MG/DL — SIGNIFICANT CHANGE UP (ref 0.2–1)
BILIRUB SERPL-MCNC: 0.3 MG/DL — SIGNIFICANT CHANGE UP (ref 0.2–1.2)
BUN SERPL-MCNC: 16 MG/DL — SIGNIFICANT CHANGE UP (ref 7–18)
CALCIUM SERPL-MCNC: 8.9 MG/DL — SIGNIFICANT CHANGE UP (ref 8.4–10.5)
CHLORIDE SERPL-SCNC: 105 MMOL/L — SIGNIFICANT CHANGE UP (ref 96–108)
CO2 SERPL-SCNC: 28 MMOL/L — SIGNIFICANT CHANGE UP (ref 22–31)
CREAT SERPL-MCNC: 0.55 MG/DL — SIGNIFICANT CHANGE UP (ref 0.5–1.3)
D DIMER BLD IA.RAPID-MCNC: 167 NG/ML DDU — SIGNIFICANT CHANGE UP
EGFR: 96 ML/MIN/1.73M2 — SIGNIFICANT CHANGE UP
GLUCOSE SERPL-MCNC: 87 MG/DL — SIGNIFICANT CHANGE UP (ref 70–99)
HCT VFR BLD CALC: 40.3 % — SIGNIFICANT CHANGE UP (ref 34.5–45)
HCV AB S/CO SERPL IA: 0.09 S/CO — SIGNIFICANT CHANGE UP (ref 0–0.99)
HCV AB SERPL-IMP: SIGNIFICANT CHANGE UP
HGB BLD-MCNC: 13.6 G/DL — SIGNIFICANT CHANGE UP (ref 11.5–15.5)
INR BLD: 1.06 RATIO — SIGNIFICANT CHANGE UP (ref 0.88–1.16)
MCHC RBC-ENTMCNC: 29.6 PG — SIGNIFICANT CHANGE UP (ref 27–34)
MCHC RBC-ENTMCNC: 33.7 GM/DL — SIGNIFICANT CHANGE UP (ref 32–36)
MCV RBC AUTO: 87.6 FL — SIGNIFICANT CHANGE UP (ref 80–100)
MRSA PCR RESULT.: SIGNIFICANT CHANGE UP
NRBC # BLD: 0 /100 WBCS — SIGNIFICANT CHANGE UP (ref 0–0)
PLATELET # BLD AUTO: 180 K/UL — SIGNIFICANT CHANGE UP (ref 150–400)
POTASSIUM SERPL-MCNC: 3.5 MMOL/L — SIGNIFICANT CHANGE UP (ref 3.5–5.3)
POTASSIUM SERPL-SCNC: 3.5 MMOL/L — SIGNIFICANT CHANGE UP (ref 3.5–5.3)
PROT SERPL-MCNC: 6.8 G/DL — SIGNIFICANT CHANGE UP (ref 6–8.3)
PROTHROM AB SERPL-ACNC: 12.6 SEC — SIGNIFICANT CHANGE UP (ref 10.5–13.4)
RBC # BLD: 4.6 M/UL — SIGNIFICANT CHANGE UP (ref 3.8–5.2)
RBC # FLD: 12.5 % — SIGNIFICANT CHANGE UP (ref 10.3–14.5)
S AUREUS DNA NOSE QL NAA+PROBE: SIGNIFICANT CHANGE UP
SODIUM SERPL-SCNC: 141 MMOL/L — SIGNIFICANT CHANGE UP (ref 135–145)
WBC # BLD: 7.45 K/UL — SIGNIFICANT CHANGE UP (ref 3.8–10.5)
WBC # FLD AUTO: 7.45 K/UL — SIGNIFICANT CHANGE UP (ref 3.8–10.5)

## 2022-05-18 PROCEDURE — 86803 HEPATITIS C AB TEST: CPT

## 2022-05-18 PROCEDURE — 87641 MR-STAPH DNA AMP PROBE: CPT

## 2022-05-18 PROCEDURE — 84145 PROCALCITONIN (PCT): CPT

## 2022-05-18 PROCEDURE — 99239 HOSP IP/OBS DSCHRG MGMT >30: CPT

## 2022-05-18 PROCEDURE — 85379 FIBRIN DEGRADATION QUANT: CPT

## 2022-05-18 PROCEDURE — 80076 HEPATIC FUNCTION PANEL: CPT

## 2022-05-18 PROCEDURE — 82728 ASSAY OF FERRITIN: CPT

## 2022-05-18 PROCEDURE — 87640 STAPH A DNA AMP PROBE: CPT

## 2022-05-18 PROCEDURE — 85027 COMPLETE CBC AUTOMATED: CPT

## 2022-05-18 PROCEDURE — 85610 PROTHROMBIN TIME: CPT

## 2022-05-18 PROCEDURE — 87635 SARS-COV-2 COVID-19 AMP PRB: CPT

## 2022-05-18 PROCEDURE — 80048 BASIC METABOLIC PNL TOTAL CA: CPT

## 2022-05-18 PROCEDURE — 86140 C-REACTIVE PROTEIN: CPT

## 2022-05-18 PROCEDURE — 71045 X-RAY EXAM CHEST 1 VIEW: CPT

## 2022-05-18 PROCEDURE — 96374 THER/PROPH/DIAG INJ IV PUSH: CPT

## 2022-05-18 PROCEDURE — 80053 COMPREHEN METABOLIC PANEL: CPT

## 2022-05-18 PROCEDURE — 99285 EMERGENCY DEPT VISIT HI MDM: CPT

## 2022-05-18 PROCEDURE — 36415 COLL VENOUS BLD VENIPUNCTURE: CPT

## 2022-05-18 PROCEDURE — 93005 ELECTROCARDIOGRAM TRACING: CPT

## 2022-05-18 PROCEDURE — 85025 COMPLETE CBC W/AUTO DIFF WBC: CPT

## 2022-05-18 RX ORDER — PANTOPRAZOLE SODIUM 20 MG/1
1 TABLET, DELAYED RELEASE ORAL
Qty: 14 | Refills: 0
Start: 2022-05-18 | End: 2022-05-24

## 2022-05-18 RX ORDER — FAMOTIDINE 10 MG/ML
1 INJECTION INTRAVENOUS
Qty: 14 | Refills: 0
Start: 2022-05-18 | End: 2022-05-24

## 2022-05-18 RX ORDER — DEXAMETHASONE 0.5 MG/5ML
1 ELIXIR ORAL
Qty: 6 | Refills: 0
Start: 2022-05-18 | End: 2022-05-21

## 2022-05-18 RX ADMIN — REMDESIVIR 500 MILLIGRAM(S): 5 INJECTION INTRAVENOUS at 10:25

## 2022-05-18 RX ADMIN — ENOXAPARIN SODIUM 40 MILLIGRAM(S): 100 INJECTION SUBCUTANEOUS at 06:15

## 2022-05-18 RX ADMIN — Medication 6 MILLIGRAM(S): at 06:14

## 2022-05-18 RX ADMIN — PANTOPRAZOLE SODIUM 40 MILLIGRAM(S): 20 TABLET, DELAYED RELEASE ORAL at 06:13

## 2022-05-18 NOTE — DISCHARGE NOTE NURSING/CASE MANAGEMENT/SOCIAL WORK - NSDCPEFALRISK_GEN_ALL_CORE
For information on Fall & Injury Prevention, visit: [email or web address] OR  [email or web address] OR  https://www.cdc.gov/steadi/patient.html

## 2022-05-18 NOTE — DISCHARGE NOTE PROVIDER - HOSPITAL COURSE
76 y/o female, from home, with significant medical history of gout presented to the ED with complaints of worsening non-productive cough, fevers, and weakness x 2 weeks.  Pt admitted for   AHRF secondary to  COVID-19.   Pt started on RDV, Dex and supplemental oxygen.  DDimer 331-->167.  Pt weaned off of oxygen.    Case discussed with attending.  Given patient's improved clinical status and current hemodynamic stability, the decision was made for discharge.    This is a summary of the patients hospitalization.  For full hospital course, please see medical record.

## 2022-05-18 NOTE — DISCHARGE NOTE PROVIDER - NSDCMRMEDTOKEN_GEN_ALL_CORE_FT
colchicine 0.6 mg oral tablet: 1 tab(s) orally once a day, As Needed  dexamethasone 2 mg oral tablet: 2 tab(s) orally once a day x 2 days  1 tab(s) orally once a day x 2 days  Protonix 40 mg oral delayed release tablet: 1 tab(s) orally every 12 hours   Tylenol 325 mg oral capsule: 1 cap(s) orally 3 times a day, As Needed  Vitamin D3 1250 mcg (50,000 intl units) oral capsule: 1 cap(s) orally once a week   colchicine 0.6 mg oral tablet: 1 tab(s) orally once a day, As Needed  dexamethasone 2 mg oral tablet: 2 tab(s) orally once a day x 2 days  1 tab(s) orally once a day x 2 days  Pepcid 20 mg oral tablet: 1 tab(s) orally every 12 hours   Tylenol 325 mg oral capsule: 1 cap(s) orally 3 times a day, As Needed  Vitamin D3 1250 mcg (50,000 intl units) oral capsule: 1 cap(s) orally once a week

## 2022-05-18 NOTE — DISCHARGE NOTE NURSING/CASE MANAGEMENT/SOCIAL WORK - PATIENT PORTAL LINK FT
You can access the FollowMyHealth Patient Portal offered by [facility] by registering at the following website: [email or web address]. By joining [employer]’s FollowMyHealth portal, you will also be able to view your health information using other applications (apps) compatible with our system.

## 2022-05-18 NOTE — DISCHARGE NOTE PROVIDER - NSDCFUADDINST_GEN_ALL_CORE_FT
CORONAVIRUS INSTRUCTIONS: Based on your current clinical status and stability, it has been determined that you no longer need hospitalization and can recover while remaining in self-quarantine at home. You should follow the prevention steps below until a healthcare provider or local or state health department says you can return to your normal activities.   1. You should restrict activities outside your home, except for getting medical care.  2. Do not go to work, school, or public areas.  3. Avoid using public transportation, ride-sharing, or taxis.  4. Separate yourself from other people and animals in your home as much as possible.  When you are around other people (e.g., sharing a room or vehicle) you should wear a facemask. 5. Wash your hands often with soap and water for at least 20 seconds, especially after blowing your nose, coughing, or sneezing; going to the bathroom; and before eating or preparing food. 6. Cover your mouth and nose with a tissue when you cough or sneeze. Throw used tissues in a lined trash can. Immediately wash your hands with soap and water for at least 20 seconds 7. High touch surfaces include counters, tabletops, doorknobs, bathroom fixtures, toilets, phones, keyboards, tablets, and bedside tables should be cleaned frequently. 8. Avoid sharing dishes, drinking glasses, cups, eating utensils, towels, or bedding with other people or pets in your home. After using these items, they should be washed thoroughly with soap and water or thrown away. You are strongly advised to seek prompt medical attention if your illness worsens or you develop new symptoms like fever or difficulty breathing.   Please call  [phone] to speak with your discharging physician if you have any questions.

## 2022-05-18 NOTE — DISCHARGE NOTE PROVIDER - NSDCCPCAREPLAN_GEN_ALL_CORE_FT
PRINCIPAL DISCHARGE DIAGNOSIS  Diagnosis: 2019 novel coronavirus disease (COVID-19)  Assessment and Plan of Treatment: CORONAVIRUS INSTRUCTIONS:  Based on your current clinical status and stability, it has been determined that you no longer need hospitalization and can recover while remaining in self-quarantine at home. You should follow the prevention steps below until a healthcare provider or local or state health department says you can return to your normal activities.   1. You should restrict activities outside your home, except for getting medical care.   2. Do not go to work, school, or public areas.   3. Avoid using public transportation, ride-sharing, or taxis.   4. Separate yourself from other people and animals in your home as much as possible.  When you are around other people (e.g., sharing a room or vehicle) you should wear a facemask.  5. Wash your hands often with soap and water for at least 20 seconds, especially after blowing your nose, coughing, or sneezing; going to the bathroom; and before eating or preparing food.  6. Cover your mouth and nose with a tissue when you cough or sneeze. Throw used tissues in a lined trash can. Immediately wash your hands with soap and water for at least 20 seconds  7. High touch surfaces include counters, tabletops, doorknobs, bathroom fixtures, toilets, phones, keyboards, tablets, and bedside tables should be cleaned frequently.  8. Avoid sharing dishes, drinking glasses, cups, eating utensils, towels, or bedding with other people or pets in your home. After using these items, they should be washed thoroughly with soap and water or thrown away.  You are strongly advised to seek prompt medical attention if your illness worsens or you develop new symptoms like fever or difficulty breathing.     Please call  [phone] to speak with your discharging physician if you have any questions.       PRINCIPAL DISCHARGE DIAGNOSIS  Diagnosis: Acute respiratory failure with hypoxia  Assessment and Plan of Treatment: You presented to the hospital with worsening cough and shortness of breath was noted to be positive for Coronavirus infection. You was placed on supplemental oxygen and started on steroids (Decadron) with significant improvement over 24 hours. You have been tapered off supplemental oxygen and noted to be saturating 97% on room air and does not home oxygen.   Please see below.      SECONDARY DISCHARGE DIAGNOSES  Diagnosis: 2019 novel coronavirus disease (COVID-19)  Assessment and Plan of Treatment: You have been vaccinated against COVID which likely kept your disease with mild symptoms. You will be tapered off steroids over one week. You may take a baby Aspirin (after meals) for a month as COVID imparts a temporary hypercoagulable state and predispose to blood clots. Your D-dimer was with in normal limits and hence does not need a blood thinner. You may take Pepcid for a week while on Steroids to prevent any stomach upset.    Diagnosis: History of gout  Assessment and Plan of Treatment: You reported having history of gout affecting your knees. You may take Colchicine as needed ehich helps with treatment as well as prevention (prophylaxis). If you get more than 2 attacks in a year you may need medeication called Allopurinol to keep Uric acid levels low to prevent an attack from developing. Please also avoid red meats and alcoholic beverages like Beer which can precipitate attack as well as encourage oral fluids to prevent dehydration.    Diagnosis: History of vitamin D deficiency  Assessment and Plan of Treatment: You was noted to be on Vitamin D replacement. Please check the levels with your Primary Doctor and reduce dose if levels are in normal limits. Excess vitamin D may be harmful. Follow up with your PCP

## 2022-05-18 NOTE — DISCHARGE NOTE NURSING/CASE MANAGEMENT/SOCIAL WORK - NSDCVIVACCINE_GEN_ALL_CORE_FT
Tdap; 25-Mar-2022 15:55; Tammy Joel (RN); [employer]; [id number] (Exp. Date: 21-Jan-2024); IntraMuscular; Deltoid Left.; 0.5 milliLiter(s); VIS (VIS Published: 09-May-2013, VIS Presented: 25-Mar-2022);

## 2022-05-18 NOTE — DISCHARGE NOTE PROVIDER - ATTENDING DISCHARGE PHYSICAL EXAMINATION:
Psych: AAO x3  Neuro: No gross focal deficits; Power and sensation intact  CVS: S1S2 present, regular, no edema  Resp: BLAE+, No wheeze or Rhonchi  GI: Soft, BS+, Non tender, non distended  Extr: No  calf tenderness B/L Lower extremities  Skin: Warm and moist without any rashes

## 2022-12-29 NOTE — ED PROVIDER NOTE - DOMESTIC TRAVEL HIGH RISK QUESTION
Endometrial biopsy    Date/Time: 12/28/2022 9:30 AM  Performed by: Amparo Thomas NP  Authorized by: Amparo Thomas NP     Consent:     Consent obtained:  Verbal    Consent given by:  Patient    Patient questions answered: yes      Patient agrees, verbalizes understanding, and wants to proceed: yes      Educational handouts given: yes      Instructions and paperwork completed: yes    Indication:     Indications: Menorrhagia    Pre-procedure:     Pre-procedure timeout performed: yes    Procedure:     Procedure: endometrial biopsy with Pipelle      Cervix cleaned and prepped: yes      A paracervical block was performed: no      An intracervical block was performed: no      The cervix was dilated: no      Uterus sounded: no      Specimen collected: specimen collected and sent to pathology      Patient tolerated procedure well with no complications: yes    
No

## 2022-12-29 NOTE — ED ADULT NURSE NOTE - CHIEF COMPLAINT
Impression: Type 2 diabetes mellitus without complications: Q49.4. Plan: No Non-Proliferative Diabetic Retinopathy, no Diabetic Macular Edema and no Neovascularization of the iris, disc, or elsewhere. Discussed ocular and systemic benefits of blood sugar control. Send notes to PCP. Check annually.  Stable The patient is a 77y Female complaining of see chief complaint quote.